# Patient Record
Sex: FEMALE | Race: BLACK OR AFRICAN AMERICAN | NOT HISPANIC OR LATINO
[De-identification: names, ages, dates, MRNs, and addresses within clinical notes are randomized per-mention and may not be internally consistent; named-entity substitution may affect disease eponyms.]

---

## 2021-11-08 ENCOUNTER — ASOB RESULT (OUTPATIENT)
Age: 26
End: 2021-11-08

## 2021-11-08 ENCOUNTER — APPOINTMENT (OUTPATIENT)
Dept: ANTEPARTUM | Facility: CLINIC | Age: 26
End: 2021-11-08
Payer: COMMERCIAL

## 2021-11-08 PROCEDURE — 76801 OB US < 14 WKS SINGLE FETUS: CPT

## 2021-11-08 PROCEDURE — 76813 OB US NUCHAL MEAS 1 GEST: CPT

## 2021-12-07 ENCOUNTER — APPOINTMENT (OUTPATIENT)
Dept: ANTEPARTUM | Facility: CLINIC | Age: 26
End: 2021-12-07

## 2021-12-27 ENCOUNTER — APPOINTMENT (OUTPATIENT)
Dept: ANTEPARTUM | Facility: CLINIC | Age: 26
End: 2021-12-27

## 2021-12-29 ENCOUNTER — LABORATORY RESULT (OUTPATIENT)
Age: 26
End: 2021-12-29

## 2021-12-30 ENCOUNTER — APPOINTMENT (OUTPATIENT)
Dept: OBGYN | Facility: CLINIC | Age: 26
End: 2021-12-30
Payer: COMMERCIAL

## 2021-12-30 VITALS — DIASTOLIC BLOOD PRESSURE: 69 MMHG | WEIGHT: 134 LBS | SYSTOLIC BLOOD PRESSURE: 105 MMHG

## 2021-12-30 DIAGNOSIS — Z82.49 FAMILY HISTORY OF ISCHEMIC HEART DISEASE AND OTHER DISEASES OF THE CIRCULATORY SYSTEM: ICD-10-CM

## 2021-12-30 PROBLEM — Z00.00 ENCOUNTER FOR PREVENTIVE HEALTH EXAMINATION: Status: ACTIVE | Noted: 2021-12-30

## 2021-12-30 PROCEDURE — 99204 OFFICE O/P NEW MOD 45 MIN: CPT

## 2022-01-28 ENCOUNTER — APPOINTMENT (OUTPATIENT)
Dept: OBGYN | Facility: CLINIC | Age: 27
End: 2022-01-28

## 2022-02-04 ENCOUNTER — APPOINTMENT (OUTPATIENT)
Dept: ANTEPARTUM | Facility: CLINIC | Age: 27
End: 2022-02-04
Payer: MEDICAID

## 2022-02-04 ENCOUNTER — ASOB RESULT (OUTPATIENT)
Age: 27
End: 2022-02-04

## 2022-02-04 PROCEDURE — 76805 OB US >/= 14 WKS SNGL FETUS: CPT

## 2022-02-06 ENCOUNTER — OUTPATIENT (OUTPATIENT)
Dept: INPATIENT UNIT | Facility: HOSPITAL | Age: 27
LOS: 1 days | Discharge: HOME | End: 2022-02-06
Payer: MEDICAID

## 2022-02-06 VITALS
TEMPERATURE: 98 F | RESPIRATION RATE: 16 BRPM | HEART RATE: 90 BPM | SYSTOLIC BLOOD PRESSURE: 112 MMHG | DIASTOLIC BLOOD PRESSURE: 65 MMHG

## 2022-02-06 VITALS — DIASTOLIC BLOOD PRESSURE: 59 MMHG | HEART RATE: 80 BPM | SYSTOLIC BLOOD PRESSURE: 113 MMHG

## 2022-02-06 DIAGNOSIS — Z3A.00 WEEKS OF GESTATION OF PREGNANCY NOT SPECIFIED: ICD-10-CM

## 2022-02-06 DIAGNOSIS — O26.899 OTHER SPECIFIED PREGNANCY RELATED CONDITIONS, UNSPECIFIED TRIMESTER: ICD-10-CM

## 2022-02-06 PROCEDURE — 99203 OFFICE O/P NEW LOW 30 MIN: CPT | Mod: 25

## 2022-02-06 PROCEDURE — 59025 FETAL NON-STRESS TEST: CPT | Mod: 26

## 2022-02-06 NOTE — OB RN TRIAGE NOTE - FALL HARM RISK - UNIVERSAL INTERVENTIONS
Bed in lowest position, wheels locked, appropriate side rails in place/Call bell, personal items and telephone in reach/Instruct patient to call for assistance before getting out of bed or chair/Non-slip footwear when patient is out of bed/[place] to call system/Physically safe environment - no spills, clutter or unnecessary equipment/Purposeful Proactive Rounding/Room/bathroom lighting operational, light cord in reach

## 2022-02-06 NOTE — OB PROVIDER TRIAGE NOTE - ADDITIONAL INSTRUCTIONS
cleared for discharge  advised to monitor FM and call if any decreased FM    follow up with recommended consult ( medicine and endocrine)    follow up with OB this week    s/s of PTL reviewed

## 2022-02-06 NOTE — OB PROVIDER TRIAGE NOTE - NSHPPHYSICALEXAM_GEN_ALL_CORE
pt seen and examined   placed on [facility]   ICU Vital Signs Last 24 Hrs  T(C): 36.7 (06 Feb 2022 15:52), Max: 36.7 (06 Feb 2022 15:52)  T(F): 98.1 (06 Feb 2022 15:52), Max: 98.1 (06 Feb 2022 15:52)  HR: 80 (06 Feb 2022 16:13) (80 - 90)  BP: 113/59 (06 Feb 2022 16:13) (112/65 - 113/59)  BP(mean): --  RR: 16 (06 Feb 2022 15:52) (16 - 16)  SpO2: 99%   pt alert OX3   lungs clear   heart s1 s2   abd soft nontender   FHR  135 pt seen and examined   placed on EFM   ICU Vital Signs Last 24 Hrs  T(C): 36.7 (06 Feb 2022 15:52), Max: 36.7 (06 Feb 2022 15:52)  T(F): 98.1 (06 Feb 2022 15:52), Max: 98.1 (06 Feb 2022 15:52)  HR: 80 (06 Feb 2022 16:13) (80 - 90)  BP: 113/59 (06 Feb 2022 16:13) (112/65 - 113/59)  BP(mean): --  RR: 16 (06 Feb 2022 15:52) (16 - 16)  SpO2: 99%   pt alert OX3   lungs clear   heart s1 s2   abd soft nontender   FHR  135  pt declined Vaginal exam  at this time   Abd scan vertex AAFI  BPP 8/8    pt sees and feels FM  during scan

## 2022-02-06 NOTE — OB PROVIDER TRIAGE NOTE - NSOBPROVIDERNOTE_OBGYN_ALL_OB_FT
26 year old female P0 at 25.5 weeks decreased FM  now feeling more fm in triage    BPP 8/8    normal leukorrhea of pregnancy  no evidence of rom on exam   no evidence of PTL       no respiratory issues on exam  normal O2 saturation  advised medical MD follow up as recommended by OB. return if any SOB CP Cough fevers     pt reassured questions answered.    case d/w  26 year old female P0 at 25.5 weeks decreased FM  now feeling more fm in triage    BPP 8/8  NST reactive    no evidence of PTL  no contractions on NST  declined SSE/VE  advised needs follow up with MD at office this week if still with vaginal discharge  denied any VB     no respiratory issues on exam  normal O2 saturation  advised medical MD follow up as recommended by OB. return if any SOB CP Cough fevers     pt reassured questions answered.    case d/w Dr Restrepo   cleared for discharge  advised to return if any further decreased FM   any VB  any LOF  any S/S of PTL   office follow up this week.

## 2022-02-06 NOTE — OB PROVIDER TRIAGE NOTE - HISTORY OF PRESENT ILLNESS
26 year old female P0 at 25.5 weeks who stated decreased FM  and stated that she had Anatomy scan done on Thursday and was told baby moving less on scan that  day  and wanted repeat scan because she felt decreased FM for last 3  days since the sonogram  pt stated that she feels FM while in Triage    stated that  she has  vaginal discharge with pregnancy  but not increased and denied any  LOF VB denied any fever chills dysuria denied any back pains   denied any contractions or cramping  denied any hx of cervical shortening   denied any hx of Covid  denied any cough  stated that she told OB  has been having intermittent SOB with pregnancy  denied any SOB in triage and stated was told by OB that she needs outpatient medicine follow up    and was awaiting  appointment for scan      med hx nasal polyp               small fibroids   surghx denied   OB hx P0   Gyn hx denied   NKDA      26 year old female P0 at 25.5 weeks who stated decreased FM  and stated that she had Anatomy scan done on Thursday and was told baby moving less on scan that  day  and wanted repeat scan because she felt decreased FM for last 3  days since the sonogram  pt stated that she feels FM while in Triage    stated that  she has  vaginal discharge with pregnancy  but not increased and denied any  LOF VB denied any fever chills dysuria denied any back pains   denied any contractions or cramping  denied any hx of cervical shortening   denied any hx of Covid  denied any cough  stated that she told OB  has been having intermittent SOB prior to with pregnancy  denied any SOB in triage denied any CP any palpitation dizziness  and stated was told by OB that she needs outpatient medicine follow up  also stated that  she needs follow up with endocrinologist for thyroid         med hx nasal polyp               small fibroids   surghx denied   OB hx P0   Gyn hx denied   NKDA

## 2022-02-06 NOTE — OB RN TRIAGE NOTE - CHIEF COMPLAINT QUOTE
decreased fm x 2-3 days ? increased discharge cramps and tightenting  increased sob  that I have a scan this month for decreased fm x 2-3 days ? increased discharge cramps and tightening  increased sob  that I have a scan this month for

## 2022-03-22 PROBLEM — D21.9 BENIGN NEOPLASM OF CONNECTIVE AND OTHER SOFT TISSUE, UNSPECIFIED: Chronic | Status: ACTIVE | Noted: 2022-02-06

## 2022-03-22 PROBLEM — Z87.09 PERSONAL HISTORY OF OTHER DISEASES OF THE RESPIRATORY SYSTEM: Chronic | Status: ACTIVE | Noted: 2022-02-06

## 2022-03-23 ENCOUNTER — APPOINTMENT (OUTPATIENT)
Dept: ANTEPARTUM | Facility: CLINIC | Age: 27
End: 2022-03-23
Payer: MEDICAID

## 2022-03-23 ENCOUNTER — ASOB RESULT (OUTPATIENT)
Age: 27
End: 2022-03-23

## 2022-03-23 PROCEDURE — 99202 OFFICE O/P NEW SF 15 MIN: CPT | Mod: TH,25

## 2022-03-23 PROCEDURE — 76818 FETAL BIOPHYS PROFILE W/NST: CPT

## 2022-03-23 PROCEDURE — 76816 OB US FOLLOW-UP PER FETUS: CPT

## 2022-03-30 ENCOUNTER — ASOB RESULT (OUTPATIENT)
Age: 27
End: 2022-03-30

## 2022-03-30 ENCOUNTER — APPOINTMENT (OUTPATIENT)
Dept: ANTEPARTUM | Facility: CLINIC | Age: 27
End: 2022-03-30

## 2022-03-30 ENCOUNTER — APPOINTMENT (OUTPATIENT)
Dept: ANTEPARTUM | Facility: CLINIC | Age: 27
End: 2022-03-30
Payer: MEDICAID

## 2022-03-30 PROCEDURE — 76818 FETAL BIOPHYS PROFILE W/NST: CPT

## 2022-04-06 ENCOUNTER — APPOINTMENT (OUTPATIENT)
Dept: ANTEPARTUM | Facility: CLINIC | Age: 27
End: 2022-04-06
Payer: MEDICAID

## 2022-04-06 ENCOUNTER — ASOB RESULT (OUTPATIENT)
Age: 27
End: 2022-04-06

## 2022-04-06 PROCEDURE — 76819 FETAL BIOPHYS PROFIL W/O NST: CPT

## 2022-04-13 ENCOUNTER — APPOINTMENT (OUTPATIENT)
Dept: ANTEPARTUM | Facility: CLINIC | Age: 27
End: 2022-04-13
Payer: MEDICAID

## 2022-04-13 ENCOUNTER — APPOINTMENT (OUTPATIENT)
Dept: ANTEPARTUM | Facility: CLINIC | Age: 27
End: 2022-04-13

## 2022-04-13 ENCOUNTER — ASOB RESULT (OUTPATIENT)
Age: 27
End: 2022-04-13

## 2022-04-13 PROCEDURE — 76816 OB US FOLLOW-UP PER FETUS: CPT

## 2022-04-13 PROCEDURE — 76818 FETAL BIOPHYS PROFILE W/NST: CPT

## 2022-04-20 ENCOUNTER — APPOINTMENT (OUTPATIENT)
Dept: ANTEPARTUM | Facility: CLINIC | Age: 27
End: 2022-04-20
Payer: MEDICAID

## 2022-04-20 ENCOUNTER — ASOB RESULT (OUTPATIENT)
Age: 27
End: 2022-04-20

## 2022-04-20 PROCEDURE — 76818 FETAL BIOPHYS PROFILE W/NST: CPT

## 2022-04-26 ENCOUNTER — APPOINTMENT (OUTPATIENT)
Dept: ANTEPARTUM | Facility: CLINIC | Age: 27
End: 2022-04-26
Payer: MEDICAID

## 2022-04-26 ENCOUNTER — ASOB RESULT (OUTPATIENT)
Age: 27
End: 2022-04-26

## 2022-04-26 PROCEDURE — 76818 FETAL BIOPHYS PROFILE W/NST: CPT

## 2022-05-03 ENCOUNTER — TRANSCRIPTION ENCOUNTER (OUTPATIENT)
Age: 27
End: 2022-05-03

## 2022-05-03 ENCOUNTER — INPATIENT (INPATIENT)
Facility: HOSPITAL | Age: 27
LOS: 2 days | Discharge: HOME | End: 2022-05-06
Attending: STUDENT IN AN ORGANIZED HEALTH CARE EDUCATION/TRAINING PROGRAM | Admitting: STUDENT IN AN ORGANIZED HEALTH CARE EDUCATION/TRAINING PROGRAM

## 2022-05-03 VITALS
HEART RATE: 77 BPM | HEIGHT: 66 IN | DIASTOLIC BLOOD PRESSURE: 58 MMHG | OXYGEN SATURATION: 99 % | TEMPERATURE: 98 F | SYSTOLIC BLOOD PRESSURE: 115 MMHG | RESPIRATION RATE: 14 BRPM | WEIGHT: 162.92 LBS

## 2022-05-03 DIAGNOSIS — O43.129 VELAMENTOUS INSERTION OF UMBILICAL CORD, UNSPECIFIED TRIMESTER: ICD-10-CM

## 2022-05-03 LAB
BASOPHILS # BLD AUTO: 0.01 K/UL — SIGNIFICANT CHANGE UP (ref 0–0.2)
BASOPHILS NFR BLD AUTO: 0.1 % — SIGNIFICANT CHANGE UP (ref 0–2)
BLD GP AB SCN SERPL QL: NEGATIVE — SIGNIFICANT CHANGE UP
EOSINOPHIL # BLD AUTO: 0.05 K/UL — SIGNIFICANT CHANGE UP (ref 0–0.5)
EOSINOPHIL NFR BLD AUTO: 0.6 % — SIGNIFICANT CHANGE UP (ref 0–6)
HCT VFR BLD CALC: 33.2 % — LOW (ref 34.5–45)
HGB BLD-MCNC: 10.8 G/DL — LOW (ref 11.5–15.5)
IANC: 5.27 K/UL — SIGNIFICANT CHANGE UP (ref 1.8–7.4)
IMM GRANULOCYTES NFR BLD AUTO: 0.3 % — SIGNIFICANT CHANGE UP (ref 0–1.5)
LYMPHOCYTES # BLD AUTO: 1.52 K/UL — SIGNIFICANT CHANGE UP (ref 1–3.3)
LYMPHOCYTES # BLD AUTO: 19.3 % — SIGNIFICANT CHANGE UP (ref 13–44)
MCHC RBC-ENTMCNC: 26.2 PG — LOW (ref 27–34)
MCHC RBC-ENTMCNC: 32.5 GM/DL — SIGNIFICANT CHANGE UP (ref 32–36)
MCV RBC AUTO: 80.6 FL — SIGNIFICANT CHANGE UP (ref 80–100)
MONOCYTES # BLD AUTO: 1 K/UL — HIGH (ref 0–0.9)
MONOCYTES NFR BLD AUTO: 12.7 % — SIGNIFICANT CHANGE UP (ref 2–14)
NEUTROPHILS # BLD AUTO: 5.27 K/UL — SIGNIFICANT CHANGE UP (ref 1.8–7.4)
NEUTROPHILS NFR BLD AUTO: 67 % — SIGNIFICANT CHANGE UP (ref 43–77)
NRBC # BLD: 0 /100 WBCS — SIGNIFICANT CHANGE UP
NRBC # FLD: 0 K/UL — SIGNIFICANT CHANGE UP
PLATELET # BLD AUTO: 260 K/UL — SIGNIFICANT CHANGE UP (ref 150–400)
RBC # BLD: 4.12 M/UL — SIGNIFICANT CHANGE UP (ref 3.8–5.2)
RBC # FLD: 17 % — HIGH (ref 10.3–14.5)
RH IG SCN BLD-IMP: POSITIVE — SIGNIFICANT CHANGE UP
RH IG SCN BLD-IMP: POSITIVE — SIGNIFICANT CHANGE UP
WBC # BLD: 7.87 K/UL — SIGNIFICANT CHANGE UP (ref 3.8–10.5)
WBC # FLD AUTO: 7.87 K/UL — SIGNIFICANT CHANGE UP (ref 3.8–10.5)

## 2022-05-03 RX ORDER — SODIUM CHLORIDE 9 MG/ML
1000 INJECTION, SOLUTION INTRAVENOUS
Refills: 0 | Status: DISCONTINUED | OUTPATIENT
Start: 2022-05-03 | End: 2022-05-03

## 2022-05-03 RX ORDER — CITRIC ACID/SODIUM CITRATE 300-500 MG
15 SOLUTION, ORAL ORAL EVERY 6 HOURS
Refills: 0 | Status: DISCONTINUED | OUTPATIENT
Start: 2022-05-03 | End: 2022-05-04

## 2022-05-03 RX ORDER — OXYTOCIN 10 UNIT/ML
333.33 VIAL (ML) INJECTION
Qty: 20 | Refills: 0 | Status: COMPLETED | OUTPATIENT
Start: 2022-05-03 | End: 2022-05-03

## 2022-05-03 RX ORDER — SODIUM CHLORIDE 9 MG/ML
1000 INJECTION, SOLUTION INTRAVENOUS
Refills: 0 | Status: DISCONTINUED | OUTPATIENT
Start: 2022-05-03 | End: 2022-05-04

## 2022-05-03 RX ORDER — OXYTOCIN 10 UNIT/ML
333.33 VIAL (ML) INJECTION
Qty: 20 | Refills: 0 | Status: DISCONTINUED | OUTPATIENT
Start: 2022-05-03 | End: 2022-05-03

## 2022-05-03 RX ORDER — CITRIC ACID/SODIUM CITRATE 300-500 MG
15 SOLUTION, ORAL ORAL EVERY 6 HOURS
Refills: 0 | Status: DISCONTINUED | OUTPATIENT
Start: 2022-05-03 | End: 2022-05-03

## 2022-05-03 NOTE — OB RN PATIENT PROFILE - FALL HARM RISK - UNIVERSAL INTERVENTIONS
Bed in lowest position, wheels locked, appropriate side rails in place/Call bell, personal items and telephone in reach/Instruct patient to call for assistance before getting out of bed or chair/Non-slip footwear when patient is out of bed/[place] to call system/Physically safe environment - no spills, clutter or unnecessary equipment/Purposeful Proactive Rounding/Room/bathroom lighting operational, light cord in reach

## 2022-05-03 NOTE — OB PROVIDER H&P - ASSESSMENT
Assessment  – 27yo  @38wk presents for scheduled IOL for umbilical vein varix. Patient stable and doing well.    Plan  1. Admit to LND. Routine Labs. IVF.  2. IOL w/ PO Cytotec. CB PRN.  3. Fetus: cat 1 tracing. VTX. EFW 3200g by sono. Continuous EFM. Sono. No concerns.  4. Prenatal issues: umbilical vein varix. h/o BV. Fibroids - 2uPRBC on hold.   5. GBS unknown  6. Pain: IV pain meds/epidural PRN    Patient discussed with attending physician, Dr. Cisneros.  Milly Cuellar MD PGY2 Assessment  – 25yo  @38wk presents for scheduled IOL for umbilical vein varix. Patient stable and doing well.    Plan  1. Admit to LND. Routine Labs. IVF.  2. IOL w/ PO Cytotec. CB PRN.  3. Fetus: cat 1 tracing. VTX. EFW 3200g by sono. Continuous EFM. Sono. No concerns.  4. Prenatal issues: umbilical vein varix. h/o BV. Fibroids - 2uPRBC on hold.   5. GBS neg  6. Pain: IV pain meds/epidural PRN    Patient discussed with attending physician, Dr. Cisneros.  Milly Cuellar MD PGY2

## 2022-05-03 NOTE — OB PROVIDER H&P - NSHPPHYSICALEXAM_GEN_ALL_CORE
Objective  – Vital Signs  Vital Signs Last 24 Hrs  T(C): 36.8 (03 May 2022 18:10), Max: 36.8 (03 May 2022 18:10)  T(F): 98.2 (03 May 2022 18:10), Max: 98.2 (03 May 2022 18:10)  HR: 77 (03 May 2022 18:10) (77 - 77)  BP: 115/58 (03 May 2022 18:10) (115/58 - 115/58)  RR: 14 (03 May 2022 18:10) (14 - 14)  SpO2: 99% (03 May 2022 18:10) (99% - 99%)  – PE:   CV: RRR  Pulm: breathing comfortably on RA  Abd: gravid, nontender  Extr: moving all extremities with ease  – VE: 0/0/-3  – FHT: baseline 130, mod variability, +accels, -decels  – [place]: irritability  – EFW: 3200g by sono  – Sono: vertex

## 2022-05-03 NOTE — OB PROVIDER H&P - HISTORY OF PRESENT ILLNESS
Admission H&P    Subjective  HPI: 25yo  @38wk presents for scheduled IOL for umbilical vein varix. +FM. -LOF. -CTXs. -VB. Pt denies any other concerns.    – PNC:    GBS unknown.  EFW 3200g by edvin.  – OBHx: denies  – GynHx: fibroids (unknown location and size). Bacterial vaginosis s/p treatment x2 in this pregnancy. Denies h/o other STIs, abnormal paps or ovarian cysts.  – PMH: anemia - no h/o IV Fe  – PSH: denies  – Psych: denies  – Social: denies tobacco use, denies EtOH use in this pregnancy. Endorses smoking marijuana twice weekly, but never in pregnancy.   – Meds: PNV, Fe  – Allergies: Cornstarch (itch, hives), NKDA  – Will accept blood transfusions? Yes Admission H&P    Subjective  HPI: 25yo  @38wk presents for scheduled IOL for umbilical vein varix. +FM. -LOF. -CTXs. -VB. Pt denies any other concerns.    – PNC: GBS neg.  EFW 3200g by edvin.  – OBHx: denies  – GynHx: fibroids (unknown location and size). Bacterial vaginosis s/p treatment x2 in this pregnancy. Denies h/o other STIs, abnormal paps or ovarian cysts.  – PMH: anemia - no h/o IV Fe  – PSH: denies  – Psych: denies  – Social: denies tobacco use, denies EtOH use in this pregnancy. Endorses smoking marijuana twice weekly, but never in pregnancy.   – Meds: PNV, Fe  – Allergies: Cornstarch (itch, hives), NKDA  – Will accept blood transfusions? Yes

## 2022-05-04 LAB
COVID-19 SPIKE DOMAIN AB INTERP: POSITIVE
COVID-19 SPIKE DOMAIN ANTIBODY RESULT: 32.2 U/ML — HIGH
SARS-COV-2 IGG+IGM SERPL QL IA: 32.2 U/ML — HIGH
SARS-COV-2 IGG+IGM SERPL QL IA: POSITIVE
T PALLIDUM AB TITR SER: NEGATIVE — SIGNIFICANT CHANGE UP

## 2022-05-04 DEVICE — SURGICEL POWDER 3 GRAMS: Type: IMPLANTABLE DEVICE | Status: FUNCTIONAL

## 2022-05-04 RX ORDER — MAGNESIUM HYDROXIDE 400 MG/1
30 TABLET, CHEWABLE ORAL
Refills: 0 | Status: DISCONTINUED | OUTPATIENT
Start: 2022-05-04 | End: 2022-05-06

## 2022-05-04 RX ORDER — ACETAMINOPHEN 500 MG
975 TABLET ORAL
Refills: 0 | Status: DISCONTINUED | OUTPATIENT
Start: 2022-05-04 | End: 2022-05-06

## 2022-05-04 RX ORDER — HEPARIN SODIUM 5000 [USP'U]/ML
5000 INJECTION INTRAVENOUS; SUBCUTANEOUS EVERY 12 HOURS
Refills: 0 | Status: DISCONTINUED | OUTPATIENT
Start: 2022-05-04 | End: 2022-05-06

## 2022-05-04 RX ORDER — CITRIC ACID/SODIUM CITRATE 300-500 MG
30 SOLUTION, ORAL ORAL ONCE
Refills: 0 | Status: COMPLETED | OUTPATIENT
Start: 2022-05-04 | End: 2022-05-04

## 2022-05-04 RX ORDER — IBUPROFEN 200 MG
600 TABLET ORAL EVERY 6 HOURS
Refills: 0 | Status: COMPLETED | OUTPATIENT
Start: 2022-05-04 | End: 2023-04-02

## 2022-05-04 RX ORDER — TETANUS TOXOID, REDUCED DIPHTHERIA TOXOID AND ACELLULAR PERTUSSIS VACCINE, ADSORBED 5; 2.5; 8; 8; 2.5 [IU]/.5ML; [IU]/.5ML; UG/.5ML; UG/.5ML; UG/.5ML
0.5 SUSPENSION INTRAMUSCULAR ONCE
Refills: 0 | Status: DISCONTINUED | OUTPATIENT
Start: 2022-05-04 | End: 2022-05-06

## 2022-05-04 RX ORDER — OXYTOCIN 10 UNIT/ML
333.33 VIAL (ML) INJECTION
Qty: 20 | Refills: 0 | Status: DISCONTINUED | OUTPATIENT
Start: 2022-05-04 | End: 2022-05-05

## 2022-05-04 RX ORDER — OXYCODONE HYDROCHLORIDE 5 MG/1
5 TABLET ORAL ONCE
Refills: 0 | Status: DISCONTINUED | OUTPATIENT
Start: 2022-05-04 | End: 2022-05-06

## 2022-05-04 RX ORDER — SIMETHICONE 80 MG/1
80 TABLET, CHEWABLE ORAL EVERY 4 HOURS
Refills: 0 | Status: DISCONTINUED | OUTPATIENT
Start: 2022-05-04 | End: 2022-05-06

## 2022-05-04 RX ORDER — METOCLOPRAMIDE HCL 10 MG
10 TABLET ORAL ONCE
Refills: 0 | Status: COMPLETED | OUTPATIENT
Start: 2022-05-04 | End: 2022-05-04

## 2022-05-04 RX ORDER — OXYCODONE HYDROCHLORIDE 5 MG/1
5 TABLET ORAL
Refills: 0 | Status: DISCONTINUED | OUTPATIENT
Start: 2022-05-04 | End: 2022-05-06

## 2022-05-04 RX ORDER — KETOROLAC TROMETHAMINE 30 MG/ML
30 SYRINGE (ML) INJECTION EVERY 6 HOURS
Refills: 0 | Status: COMPLETED | OUTPATIENT
Start: 2022-05-04 | End: 2022-05-05

## 2022-05-04 RX ORDER — SODIUM CHLORIDE 9 MG/ML
1000 INJECTION, SOLUTION INTRAVENOUS
Refills: 0 | Status: DISCONTINUED | OUTPATIENT
Start: 2022-05-04 | End: 2022-05-06

## 2022-05-04 RX ORDER — FAMOTIDINE 10 MG/ML
20 INJECTION INTRAVENOUS ONCE
Refills: 0 | Status: COMPLETED | OUTPATIENT
Start: 2022-05-04 | End: 2022-05-04

## 2022-05-04 RX ORDER — LANOLIN
1 OINTMENT (GRAM) TOPICAL EVERY 6 HOURS
Refills: 0 | Status: DISCONTINUED | OUTPATIENT
Start: 2022-05-04 | End: 2022-05-06

## 2022-05-04 RX ORDER — SODIUM CHLORIDE 9 MG/ML
1000 INJECTION, SOLUTION INTRAVENOUS
Refills: 0 | Status: DISCONTINUED | OUTPATIENT
Start: 2022-05-04 | End: 2022-05-05

## 2022-05-04 RX ORDER — ONDANSETRON 8 MG/1
4 TABLET, FILM COATED ORAL ONCE
Refills: 0 | Status: COMPLETED | OUTPATIENT
Start: 2022-05-04 | End: 2022-05-04

## 2022-05-04 RX ORDER — DIPHENHYDRAMINE HCL 50 MG
25 CAPSULE ORAL EVERY 6 HOURS
Refills: 0 | Status: DISCONTINUED | OUTPATIENT
Start: 2022-05-04 | End: 2022-05-06

## 2022-05-04 RX ADMIN — Medication 10 MILLIGRAM(S): at 15:10

## 2022-05-04 RX ADMIN — ONDANSETRON 4 MILLIGRAM(S): 8 TABLET, FILM COATED ORAL at 17:52

## 2022-05-04 RX ADMIN — Medication 1000 MILLIUNIT(S)/MIN: at 14:44

## 2022-05-04 RX ADMIN — Medication 30 MILLILITER(S): at 12:41

## 2022-05-04 RX ADMIN — HEPARIN SODIUM 5000 UNIT(S): 5000 INJECTION INTRAVENOUS; SUBCUTANEOUS at 22:50

## 2022-05-04 RX ADMIN — FAMOTIDINE 20 MILLIGRAM(S): 10 INJECTION INTRAVENOUS at 12:41

## 2022-05-04 RX ADMIN — Medication 975 MILLIGRAM(S): at 23:44

## 2022-05-04 RX ADMIN — SODIUM CHLORIDE 75 MILLILITER(S): 9 INJECTION, SOLUTION INTRAVENOUS at 14:44

## 2022-05-04 RX ADMIN — Medication 975 MILLIGRAM(S): at 22:44

## 2022-05-04 RX ADMIN — Medication 0.25 MILLIGRAM(S): at 12:30

## 2022-05-04 NOTE — CHART NOTE - NSCHARTNOTEFT_GEN_A_CORE
Pt was examined s/p epidural for recurrent late decelerations   s/p Terb x1   given 1v fluid bolus   Patient was counseled for C/s for Cat 2 tracing remote for delivery   Patient was agreeable. Patient signed Informed consent   L/D team aware
NP note    FHR baseline indeterminate  Wandering baseline noted  Moderate variability  Patient currently on birthing ball  Patient requested to lie in bed to determine contraction pattern more accurately  IV bolus initiated by RN  Will closely monitor    MD Bender made aware    Nellie RUEDA
patient seen at bedside  ambulating next to bed  feeling well, no complaints  FHT Cat I, [place] irregular  on PO cytotec, 40 mcg  patient declined vaginal exam  continue cytotec induction

## 2022-05-04 NOTE — OB RN INTRAOPERATIVE NOTE - NSSELHIDDEN_OBGYN_ALL_OB_FT
[NS_DeliveryAttending1_OBGYN_ALL_OB_FT:[email or web address]=],[NS_DeliveryRN_OBGYN_ALL_OB_FT:[id number]=] [NS_DeliveryAttending1_OBGYN_ALL_OB_FT:[email or web address]=],[NS_DeliveryRN_OBGYN_ALL_OB_FT:[id number]=],[NS_DeliveryAssist1_OBGYN_ALL_OB_FT:[id number]=]

## 2022-05-04 NOTE — OB NEONATOLOGY/PEDIATRICIAN DELIVERY SUMMARY - NSPEDSNEONOTESA_OBGYN_ALL_OB_FT
Pediatrician called to delivery for primary  for category II tracing. Male infant born at 38.1 wks via  to a 27 y/o  blood type B+ mother, received versed during labor. Maternal history of anemia. Prenatal history of bacterial vaginosis treated twice during pregnancy, and umbilical vein varix. Prenatal labs RPR non-reactive, HBsAg -, Rubella immune, HIV -, GBS - on . COVID neg. ROM at delivery with clear fluids. Baby emerged vigorous, crying. Cord clamping not delayed. Infant was brought to radiant warmer and warmed, dried, stimulated and suctioned. HR>100, normal respiratory effort, good tone. APGARS of 8/9. Mom is initiating breast feeding. Consents to Hepatitis B vaccination. Desires for infant to be circumcised. EOS score not applicable as no labor, no rupture.

## 2022-05-04 NOTE — OB RN DELIVERY SUMMARY - NS_SEPSISRSKCALC_OBGYN_ALL_OB_FT
EOS calculated successfully. EOS Risk Factor: 0.5/1000 live births ([facility] national incidence); GA=38w1d; Temp=99.32; ROM=0.017; GBS='Negative'; Antibiotics='No antibiotics or any antibiotics < 2 hrs prior to birth'

## 2022-05-04 NOTE — OB PROVIDER DELIVERY SUMMARY - NSPROVIDERDELIVERYNOTE_OBGYN_ALL_OB_FT
viable infant, cephalic presentation, weight 2870g, APGARS 8/9  grossly normal uterus, b/l tubes and ovaries  hysterotomy closed in 1 layer with chromic suture  TXA and Methergine administered for uterine atony  Surgicel applied to hysterotomy  rectus muscle reapproximated with chromic suture    436/1300/250    Dictation #: viable infant, cephalic presentation, weight 2870g, APGARS 8/9  grossly normal uterus, b/l tubes and ovaries  hysterotomy closed in 1 layer with chromic suture  TXA and Methergine administered for uterine atony  Surgicel applied to hysterotomy  rectus muscle reapproximated with chromic suture    436/1300/250    Dictation #: [id number]

## 2022-05-04 NOTE — OB RN DELIVERY SUMMARY - NSSELHIDDEN_OBGYN_ALL_OB_FT
[NS_DeliveryAttending1_OBGYN_ALL_OB_FT:[email or web address]=],[NS_DeliveryRN_OBGYN_ALL_OB_FT:[id number]=] [NS_DeliveryAttending1_OBGYN_ALL_OB_FT:MjYzNTgzMDExOTA=],[NS_DeliveryRN_OBGYN_ALL_OB_FT:[id number]=],[NS_DeliveryAssist1_OBGYN_ALL_OB_FT:[id number]=]

## 2022-05-04 NOTE — OB PROVIDER DELIVERY SUMMARY - NSSELHIDDEN_OBGYN_ALL_OB_FT
[NS_DeliveryAttending1_OBGYN_ALL_OB_FT:[email or web address]=],[NS_DeliveryRN_OBGYN_ALL_OB_FT:[id number]=]

## 2022-05-04 NOTE — OB PROVIDER LABOR PROGRESS NOTE - NS_SUBJECTIVE/OBJECTIVE_OBGYN_ALL_OB_FT
Patient seen and examined secondary to category 2 tracing.  VS  T(C): 36.7 (05-04-22 @ 07:35)  HR: 76 (05-04-22 @ 07:34)  BP: 116/55 (05-04-22 @ 07:34)  RR: 16 (05-03-22 @ 19:29)  SpO2: 99% (05-04-22 @ 07:33)
Patient seen and evaluated at bedside for cervical exam and attempted cervical balloon placement. Patient comfortable.     Vital Signs Last 24 Hrs  T(C): 36.7 (04 May 2022 07:35), Max: 36.8 (03 May 2022 18:10)  T(F): 98.06 (04 May 2022 07:35), Max: 98.2 (03 May 2022 18:10)  HR: 76 (04 May 2022 07:34) (76 - 81)  BP: 116/55 (04 May 2022 07:34) (112/56 - 116/55)  BP(mean): --  RR: 16 (03 May 2022 19:29) (14 - 16)  SpO2: 99% (04 May 2022 07:33) (99% - 99%)
Patient with recurrent late decelerations unresponsive to fetal resuscitations.   Terbutaline x1 given due to recurrent decelerations with ctx.   Discussed with Dr. Lopez who is in the OR.   Plan for  delivery.   Will start moving patient to OR

## 2022-05-04 NOTE — OB PROVIDER LABOR PROGRESS NOTE - ASSESSMENT
Admitted for IOL for umbilical vein varix.   Persistent cat 2 tracing  Plan for  delivery   Will start moving to OR     TAMMIE Carroll PGY4  d/w Dr. Lopez     
27yo  @38+1 admitted for IOL for umbilcal vein varix. Patient stable and doing well. Patient evaluated for cervical balloon placement, however, patient not dilated, so balloon not placed.     Plan  - Continue PO Cytotec  - CB when able  - T cat 1  - GBS neg    d/w Dr. Yulia Cuellar PGY2
No cervical change noted  Patient repositioned left lateral  Oxygen via nonrebreather placed  MD Bender notified of category II tracing  PO cytotec held at this time  MD to come evaluate tracing at this time  Will continue to closely monitor    Nellie RUEDA

## 2022-05-04 NOTE — OB PROVIDER DELIVERY SUMMARY - DELIVERY COMPLICATIONS; ABNORMAL FETAL HEART RATE
Regarding: [place]- constipation   ----- Message from Timothy Harmon sent at 9/9/2021  8:04 AM CDT -----  Patient Name: Navin Bourgeois    Full Name of Provider seen for current symptoms:Primo Mariano, DO    Pregnant (If Yes, how long?):    Symptoms: constipation - wants to know what meds they gave her to help with constipation     Do you or any of your household members have the following symptoms:  Fever >100.0#F or >38.0#C: No    New or worsening cough, shortness of breath, sore throat, congestion, or runny nose: No    New onset of nausea, vomiting or diarrhea: No    New onset of loss of taste or smell, chills, repeated shaking with chills, muscle pain, or headache: No    Have you, a household member, or another person you have been in contact with tested positive for COVID-19 in the last 14 days?: No    Call Back #:[phone]    Call Center Account # for provider seen for current symptoms:[id number]    Which State are you currently located in? (enter State name in Summary field): [place]    variable decelerations

## 2022-05-05 LAB
BASOPHILS # BLD AUTO: 0.02 K/UL — SIGNIFICANT CHANGE UP (ref 0–0.2)
BASOPHILS NFR BLD AUTO: 0.2 % — SIGNIFICANT CHANGE UP (ref 0–2)
EOSINOPHIL # BLD AUTO: 0.02 K/UL — SIGNIFICANT CHANGE UP (ref 0–0.5)
EOSINOPHIL NFR BLD AUTO: 0.2 % — SIGNIFICANT CHANGE UP (ref 0–6)
HCT VFR BLD CALC: 32.2 % — LOW (ref 34.5–45)
HGB BLD-MCNC: 10.2 G/DL — LOW (ref 11.5–15.5)
IANC: 9.92 K/UL — HIGH (ref 1.8–7.4)
IMM GRANULOCYTES NFR BLD AUTO: 0.4 % — SIGNIFICANT CHANGE UP (ref 0–1.5)
LYMPHOCYTES # BLD AUTO: 1.47 K/UL — SIGNIFICANT CHANGE UP (ref 1–3.3)
LYMPHOCYTES # BLD AUTO: 11.5 % — LOW (ref 13–44)
MCHC RBC-ENTMCNC: 25.4 PG — LOW (ref 27–34)
MCHC RBC-ENTMCNC: 31.7 GM/DL — LOW (ref 32–36)
MCV RBC AUTO: 80.3 FL — SIGNIFICANT CHANGE UP (ref 80–100)
MONOCYTES # BLD AUTO: 1.3 K/UL — HIGH (ref 0–0.9)
MONOCYTES NFR BLD AUTO: 10.2 % — SIGNIFICANT CHANGE UP (ref 2–14)
NEUTROPHILS # BLD AUTO: 9.92 K/UL — HIGH (ref 1.8–7.4)
NEUTROPHILS NFR BLD AUTO: 77.5 % — HIGH (ref 43–77)
NRBC # BLD: 0 /100 WBCS — SIGNIFICANT CHANGE UP
NRBC # FLD: 0 K/UL — SIGNIFICANT CHANGE UP
PLATELET # BLD AUTO: 225 K/UL — SIGNIFICANT CHANGE UP (ref 150–400)
RBC # BLD: 4.01 M/UL — SIGNIFICANT CHANGE UP (ref 3.8–5.2)
RBC # FLD: 16.8 % — HIGH (ref 10.3–14.5)
WBC # BLD: 12.78 K/UL — HIGH (ref 3.8–10.5)
WBC # FLD AUTO: 12.78 K/UL — HIGH (ref 3.8–10.5)

## 2022-05-05 RX ORDER — IBUPROFEN 200 MG
600 TABLET ORAL EVERY 6 HOURS
Refills: 0 | Status: DISCONTINUED | OUTPATIENT
Start: 2022-05-05 | End: 2022-05-06

## 2022-05-05 RX ADMIN — SIMETHICONE 80 MILLIGRAM(S): 80 TABLET, CHEWABLE ORAL at 22:34

## 2022-05-05 RX ADMIN — Medication 975 MILLIGRAM(S): at 22:37

## 2022-05-05 RX ADMIN — Medication 975 MILLIGRAM(S): at 13:50

## 2022-05-05 RX ADMIN — HEPARIN SODIUM 5000 UNIT(S): 5000 INJECTION INTRAVENOUS; SUBCUTANEOUS at 22:34

## 2022-05-05 RX ADMIN — Medication 30 MILLIGRAM(S): at 03:38

## 2022-05-05 RX ADMIN — Medication 975 MILLIGRAM(S): at 14:30

## 2022-05-05 RX ADMIN — Medication 30 MILLIGRAM(S): at 11:07

## 2022-05-05 RX ADMIN — Medication 600 MILLIGRAM(S): at 17:17

## 2022-05-05 RX ADMIN — HEPARIN SODIUM 5000 UNIT(S): 5000 INJECTION INTRAVENOUS; SUBCUTANEOUS at 10:46

## 2022-05-05 RX ADMIN — Medication 30 MILLIGRAM(S): at 10:46

## 2022-05-05 RX ADMIN — Medication 975 MILLIGRAM(S): at 23:37

## 2022-05-05 RX ADMIN — Medication 30 MILLIGRAM(S): at 04:38

## 2022-05-05 RX ADMIN — Medication 600 MILLIGRAM(S): at 18:00

## 2022-05-05 NOTE — PROGRESS NOTE ADULT - SUBJECTIVE AND OBJECTIVE BOX
Post-Operative Note, C/S  She is a  26y woman who is now post-operative day: 1    Subjective:  The patient feels well.  She is ambulating.   She is tolerating regular diet.  She denies nausea and vomiting; denies fever.  She is voiding.  Her pain is controlled; incisional pain is appropriate.  She reports normal postpartum bleeding.  She is breastfeeding.  She is formula feeding.    Physical exam:    Vital Signs Last 24 Hrs  T(C): 36.8 (05 May 2022 09:48), Max: 36.9 (04 May 2022 22:36)  T(F): 98.3 (05 May 2022 09:48), Max: 98.5 (05 May 2022 01:42)  HR: 83 (05 May 2022 09:48) (67 - 87)  BP: 102/65 (05 May 2022 09:48) (102/65 - 134/81)  BP(mean): 92 (04 May 2022 16:00) (74 - 92)  RR: 18 (05 May 2022 05:31) (12 - 19)  SpO2: 100% (05 May 2022 09:48) (97% - 100%)    Gen: NAD  Breast: Soft, nontender, not engorged.  Abdomen: Soft, nontender, no distension , firm uterine fundus at umbilicus.  Incision: C/D/I.  Pelvic: Normal lochia noted  Ext: No calf tenderness    LABS:                        10.2   12.78 )-----------( 225      ( 05 May 2022 05:38 )             32.2       Rubella status:     Allergies    No Known Allergies    Intolerances      MEDICATIONS  (STANDING):  acetaminophen     Tablet .. 975 milliGRAM(s) Oral <User Schedule>  diphtheria/tetanus/pertussis (acellular) Vaccine (ADAcel) 0.5 milliLiter(s) IntraMuscular once  heparin   Injectable 5000 Unit(s) SubCutaneous every 12 hours  ibuprofen  Tablet. 600 milliGRAM(s) Oral every 6 hours  ketorolac   Injectable 30 milliGRAM(s) IV Push every 6 hours  lactated ringers. 1000 milliLiter(s) (125 mL/Hr) IV Continuous <Continuous>  lactated ringers. 1000 milliLiter(s) (75 mL/Hr) IV Continuous <Continuous>  oxytocin Infusion 333.333 milliUNIT(s)/Min (1000 mL/Hr) IV Continuous <Continuous>    MEDICATIONS  (PRN):  diphenhydrAMINE 25 milliGRAM(s) Oral every 6 hours PRN Pruritus  lanolin Ointment 1 Application(s) Topical every 6 hours PRN Sore Nipples  magnesium hydroxide Suspension 30 milliLiter(s) Oral two times a day PRN Constipation  oxyCODONE    IR 5 milliGRAM(s) Oral every 3 hours PRN Moderate to Severe Pain (4-10)  oxyCODONE    IR 5 milliGRAM(s) Oral once PRN Moderate to Severe Pain (4-10)  simethicone 80 milliGRAM(s) Chew every 4 hours PRN Gas        Assessment and Plan  POD # s/p C/S.  Doing well.  Encourage ambulation.  Incisional care and PO instructions reviewed.  CPC.         Post-Operative Note, C/S  She is a  26y woman who is now post-operative day: 1    Subjective:  The patient feels well.  She is ambulating.   She is tolerating regular diet.  She denies nausea and vomiting; denies fever.  She is voiding.  Her pain is controlled; incisional pain is appropriate.  She reports normal postpartum bleeding.  She is breastfeeding.  She is formula feeding.    Physical exam:    Vital Signs Last 24 Hrs  T(C): 36.8 (05 May 2022 09:48), Max: 36.9 (04 May 2022 22:36)  T(F): 98.3 (05 May 2022 09:48), Max: 98.5 (05 May 2022 01:42)  HR: 83 (05 May 2022 09:48) (67 - 87)  BP: 102/65 (05 May 2022 09:48) (102/65 - 134/81)  BP(mean): 92 (04 May 2022 16:00) (74 - 92)  RR: 18 (05 May 2022 05:31) (12 - 19)  SpO2: 100% (05 May 2022 09:48) (97% - 100%)    Gen: NAD  Breast: Soft, nontender, not engorged.  Abdomen: Soft, nontender, no distension , firm uterine fundus at umbilicus.  Incision: C/D/I.  Pelvic: Normal lochia noted  Ext: No calf tenderness    LABS:                        10.2   12.78 )-----------( 225      ( 05 May 2022 05:38 )             32.2       Rubella status:     Allergies    No Known Allergies    Intolerances      MEDICATIONS  (STANDING):  acetaminophen     Tablet .. 975 milliGRAM(s) Oral <User Schedule>  diphtheria/tetanus/pertussis (acellular) Vaccine (ADAcel) 0.5 milliLiter(s) IntraMuscular once  heparin   Injectable 5000 Unit(s) SubCutaneous every 12 hours  ibuprofen  Tablet. 600 milliGRAM(s) Oral every 6 hours  ketorolac   Injectable 30 milliGRAM(s) IV Push every 6 hours  lactated ringers. 1000 milliLiter(s) (125 mL/Hr) IV Continuous <Continuous>  lactated ringers. 1000 milliLiter(s) (75 mL/Hr) IV Continuous <Continuous>  oxytocin Infusion 333.333 milliUNIT(s)/Min (1000 mL/Hr) IV Continuous <Continuous>    MEDICATIONS  (PRN):  diphenhydrAMINE 25 milliGRAM(s) Oral every 6 hours PRN Pruritus  lanolin Ointment 1 Application(s) Topical every 6 hours PRN Sore Nipples  magnesium hydroxide Suspension 30 milliLiter(s) Oral two times a day PRN Constipation  oxyCODONE    IR 5 milliGRAM(s) Oral every 3 hours PRN Moderate to Severe Pain (4-10)  oxyCODONE    IR 5 milliGRAM(s) Oral once PRN Moderate to Severe Pain (4-10)  simethicone 80 milliGRAM(s) Chew every 4 hours PRN Gas

## 2022-05-05 NOTE — LACTATION INITIAL EVALUATION - INTERVENTION OUTCOME
Mother and Infant roomed-in.   Mother educated on safe skin to skin care, safe sleep practices and environment. Call bell within reach./verbalizes understanding/demonstrates understanding of teaching/good return demonstration/needs met/Lactation team to follow up

## 2022-05-05 NOTE — LACTATION INITIAL EVALUATION - LACTATION INTERVENTIONS
Mom educated about babies less than 24 hours of age will be sleepy. Made aware of cluster feeding that occurs after 24 hours of life and to be cautious of sleep deprivation in order to maintain infant and mother safety. Instructed to place infant in bassinet or call for assistance if feeling sleepy or tired.Recognition of feeding cues and to feed the baby on demand based on cues at least 8-12 times in a day. Instructed pt. to wake the baby to feed if no feeding cues are seen within 3h since prior feed. Pt. educated on the nutritional needs of the baby, how many wet and dirty diapers to expect, along with the amount of times the baby needs to be placed on the breast at this time.  use  feeding log to record feedings along with wet and dirty diapers. instructed in hand expression with good return demonstration.  Reviewed safe skin to skin. Verbalized understanding of education.  Discussed  prevention  and  treatment of  sore nipples using colostrum care and/or lanolin. Reviewed proper positioning no matter what position she chooses to use: belly to belly, alignment, and supporting the head and shoulders. Instructed in hand expression with good return demonstration. + colostrum noted./initiate/review safe skin-to-skin/initiate/review hand expression/initiate/review pumping guidelines and safe milk handling/initiate/review techniques for position and latch/post discharge community resources provided/review techniques to increase milk supply/review techniques to manage sore nipples/engorgement/initiate/review breast massage/compression/initiate/review alternate feeding method/reviewed components of an effective feeding and at least 8 effective feedings per day required/reviewed importance of monitoring infant diapers, the breastfeeding log, and minimum output each day/reviewed risks of unnecessary formula supplementation/reviewed risks of artificial nipples/reviewed strategies to transition to breastfeeding only/reviewed benefits and recommendations for rooming in/reviewed feeding on demand/by cue at least 8 times a day/recommended follow-up with pediatrician within 24 hours of discharge

## 2022-05-05 NOTE — PROGRESS NOTE ADULT - SUBJECTIVE AND OBJECTIVE BOX
Pain Service Follow-up  Postop Day  1    S/P  C- Section    T(C): 36.8 (05-05-22 @ 05:31), Max: 37.4 (05-04-22 @ 11:01)  HR: 82 (05-05-22 @ 05:31) (67 - 102)  BP: 115/63 (05-05-22 @ 05:31) (98/59 - 134/81)  RR: 18 (05-05-22 @ 05:31) (12 - 19)  SpO2: 100% (05-05-22 @ 05:31) (92% - 100%)  Wt(kg): --      THERAPY:     S/P Epidural Morphine    Sedation Score:	  [X] Alert	      [  ] Drowsy       [  ] Arousable	[  ] Asleep         [  ] Unresponsive    Side Effects:	  [X] None	      [  ] Nausea       [  ] Pruritus        [  ] Weakness   [  ] Numbness        ASSESSMENT/ PLAN   [ X ] Discontinue         [  ] Continue    [ X ] Documentation and Verification of current medications       Satisfactory Post Anesthetic Course

## 2022-05-05 NOTE — PROGRESS NOTE ADULT - SUBJECTIVE AND OBJECTIVE BOX
OB Progress Note:  Delivery, POD#1    S: 27yo POD#1 s/p pLTCS for Cat II tracing, QBL = 436. Her pain is well controlled. She is tolerating a regular diet and passing flatus. Denies N/V. Denies CP/SOB/lightheadedness/dizziness.   She is ambulating without difficulty.   Brown catheter removed at 1am, due to void at 9am. Feels urge to urinate, will go to bathroom after    O:   Vital Signs Last 24 Hrs  T(C): 36.8 (05 May 2022 05:31), Max: 37.4 (04 May 2022 11:01)  T(F): 98.3 (05 May 2022 05:31), Max: 99.32 (04 May 2022 11:01)  HR: 82 (05 May 2022 05:31) (67 - 102)  BP: 115/63 (05 May 2022 05:31) (98/59 - 134/81)  BP(mean): 92 (04 May 2022 16:00) (74 - 92)  RR: 18 (05 May 2022 05:31) (12 - 19)  SpO2: 100% (05 May 2022 05:31) (92% - 100%)    Labs:  Blood type: B Positive  Rubella IgG: RPR: Negative                          10.2<L>   12.78<H> >-----------< 225    (  @ 05:38 )             32.2<L>                        10.8<L>   7.87 >-----------< 260    (  @ 18:52 )             33.2<L>                  PE:  General: NAD  Abdomen: Mildly distended, appropriately tender, incision c/d/i.  Extremities: No erythema, no pitting edema

## 2022-05-06 ENCOUNTER — TRANSCRIPTION ENCOUNTER (OUTPATIENT)
Age: 27
End: 2022-05-06

## 2022-05-06 VITALS
DIASTOLIC BLOOD PRESSURE: 78 MMHG | OXYGEN SATURATION: 100 % | RESPIRATION RATE: 17 BRPM | HEART RATE: 80 BPM | SYSTOLIC BLOOD PRESSURE: 126 MMHG | TEMPERATURE: 98 F

## 2022-05-06 RX ORDER — ACETAMINOPHEN 500 MG
3 TABLET ORAL
Qty: 0 | Refills: 0 | DISCHARGE
Start: 2022-05-06

## 2022-05-06 RX ORDER — LANOLIN
1 OINTMENT (GRAM) TOPICAL
Qty: 0 | Refills: 0 | DISCHARGE
Start: 2022-05-06

## 2022-05-06 RX ORDER — IBUPROFEN 200 MG
1 TABLET ORAL
Qty: 0 | Refills: 0 | DISCHARGE
Start: 2022-05-06

## 2022-05-06 RX ADMIN — Medication 600 MILLIGRAM(S): at 09:24

## 2022-05-06 RX ADMIN — Medication 975 MILLIGRAM(S): at 07:00

## 2022-05-06 RX ADMIN — HEPARIN SODIUM 5000 UNIT(S): 5000 INJECTION INTRAVENOUS; SUBCUTANEOUS at 10:19

## 2022-05-06 RX ADMIN — SIMETHICONE 80 MILLIGRAM(S): 80 TABLET, CHEWABLE ORAL at 09:24

## 2022-05-06 RX ADMIN — Medication 600 MILLIGRAM(S): at 09:54

## 2022-05-06 RX ADMIN — SIMETHICONE 80 MILLIGRAM(S): 80 TABLET, CHEWABLE ORAL at 03:09

## 2022-05-06 RX ADMIN — Medication 600 MILLIGRAM(S): at 03:10

## 2022-05-06 RX ADMIN — Medication 975 MILLIGRAM(S): at 11:48

## 2022-05-06 RX ADMIN — Medication 975 MILLIGRAM(S): at 06:02

## 2022-05-06 RX ADMIN — Medication 600 MILLIGRAM(S): at 04:10

## 2022-05-06 RX ADMIN — Medication 975 MILLIGRAM(S): at 12:18

## 2022-05-06 NOTE — PROGRESS NOTE ADULT - ASSESSMENT
A/P: 25yo POD#1 s/p pLTCS for Cat II tracing, QBL = 436.  Patient is stable and doing well post-operatively.    - s/p IM Methergine and TXA for uterotonics   - Continue regular diet.  - Increase ambulation.  - Continue motrin, tylenol, oxycodone PRN for pain control.    - F/u AM CBC  - f/u passage of void at 9am    Yun Maldonado MD PGY1
Assessment and Plan  POD #1 s/p P/C/S for category 2 tracing.     Doing well.  Encourage ambulation.  Incisional care and PO instructions reviewed.  CPC.     s/p IM Methergine and TXA for uterotonics   - Continue regular diet.  - Increase ambulation.  - Continue motrin, tylenol, oxycodone PRN for pain control.    -patient voided a second time but it was not measured, pending second measured void    Discharge planning    Discussed with MD Lauro Shaw  
Assessment and Plan  POD #2 s/p C/S.  Doing well and bonding with baby  Encourage ambulation.  Incisional care and PO instructions reviewed.  CPC.  Discharge home today  RTO 2 weeks for post op visit/PRN

## 2022-05-06 NOTE — DISCHARGE NOTE OB - NSDCQMCOGNITION_NEU_ALL_CORE
[facility]  Hospitalist Discharge Summary       Date of Admission:  7/4/2020  Date of Discharge:  7/5/2020  Discharging Provider: Garret Vargas MD      Discharge Diagnoses   Transient ischemic attack  Seizure disorder   History of CVA  Carotid artery stenosis   Hyponatremia  Recent left shoulder dislocation   Diabetes mellitus, type 2, on oral antidiabetic medications   Peripheral arterial disease  Coronary artery disease  Ischemic cardiomyopathy  Paroxysmal atrial fibrillation   GERD  Urinary retention   Depression  Cognitive impairment    Follow-ups Needed After Discharge   Follow-up Appointments     Follow Up and recommended labs and tests      Follow up with Nursing home physician.  No follow up labs or test are   needed. Follow up with [facility] in 2-4 weeks.             Hospital Course   Dustin Pearce is a 89 year-old male with history of stroke, seizure disorder, carotid artery stenosis, coronary artery disease with ischemic cardiomyopathy, diabetes mellitus type 2, peripheral arterial disease, paroxysmal atrial fibrillation who presents with transient left-sided weakness.  Admitted on 7/4/2020.      Transient ischemic attack  Seizure disorder   History of CVA  Carotid artery stenosis   Presents with transient left-sided weakness. Head CT with no acute porcess. CTA head/neck with multiple areas of stenosis. History of prior stroke as well as seizure disorder, other ddx for symptoms includes partial seizure.   *HgbA1c 6.3% 6/1/20  *Echocardiogram with bubble negative 4/2020, EF 30-35%, will defer repeat.   *MRI brain with no acute abnormality  - LDL 26, HDL 51. Will reduce atorvastatin to 20 mg daily   - Zio patch ordered per neurology recommendations  - PT only ordered due to observation status, recommend discharge back to TCU  - Continue prior to admission aspirin and ticagrelor pending MRI  - Permissive hypertension; PRN anti-hypertensives ordered for severe  elevations  - Continue prior to admission lacosamide, levetiracetam. Levetiracetam level therapeutic.     Hyponatremia  Mild, not likely contributing to presentation.   - Resolved 7/5 with IVF     Recent left shoulder dislocation   Currently in TCU.   - Continue scheduled acetaminophen  - Outpatient follow-up with orthopedic surgery as previously planned     Left sided chest pain  Reproducible on exam, some mild bruising left side of chest.  - Rib XR negative for fracture     Diabetes mellitus, type 2, on oral antidiabetic medications   HgbA1c 6.3% 6/2020. Prior to admission on metformin.  - Continue prior to admission metformin      Peripheral arterial disease  Coronary artery disease  Ischemic cardiomyopathy  No longer on beta blocker due to orthostatic events  - Continue prior to admission ticagrelor, aspirin  - Statin reduced as above    Paroxysmal atrial fibrillation   Noted in medical record and on prior echocardiogram 2017. Recent Zio patch with brief premature beats and atrial/ventricular runs without significant hayde or tachyarrhythmias.  - Continue DAPT       GERD  Continue prior to admission PPI     Urinary retention   Presents from facility with Luis.  - Continue prior to admission tamsulosin   - Continue Luis      Depression  Continue prior to admission duloxetine      Cognitive impairment  - Re-orient as needed  - Maintain normal day/night, sleep wake cycles  - Minimize sedating/altering medications as able  - Treat separate conditions as detailed above/below  - Discharged back to TCU     Consultations This Hospital Stay   NEUROLOGY IP CONSULT  PHYSICAL THERAPY ADULT IP CONSULT  SMOKING CESSATION PROGRAM IP CONSULT  OCCUPATIONAL THERAPY ADULT IP CONSULT  PHYSICAL THERAPY ADULT IP CONSULT    Code Status   Full Code    Time Spent on this Encounter   Garret BOYD MD, personally saw the patient today and spent greater than 30 minutes discharging this patient.       Garret Vargas MD  [place]  "[facility]  ______________________________________________________________________    Physical Exam   Vital Signs: Temp: 97.3  F (36.3  C) Temp src: Oral BP: 129/58 Pulse: 76 Heart Rate: 79 Resp: 16 SpO2: 96 % O2 Device: None (Room air)    Weight: 125 lbs 14.4 oz    Respiratory:     Clear to auscultation bilaterally, good air movement bilaterally  Cardiovascular: RRR, no m/r/g. No peripheral edema.  GI: Soft, non-tender, non-distended.   Skin/Integumen: Warm, dry  Neuro: Alert. Oriented to self, \"hospital\" (not specific one), not able to state date or reason for hospitalization. Face symmetric. Tongue midline. 5/5 upper and lower extremity strength symmetric bilaterally.         Primary Care Physician   Matt Morrissey    Discharge Disposition   Discharged to transitional care unit   Condition at discharge: Stable      Discharge Orders      General info for SNF    Length of Stay Estimate: Short Term Care: Estimated # of Days <30  Condition at Discharge: Improving  Level of care:skilled   Rehabilitation Potential: Good  Admission H&P remains valid and up-to-date: Yes  Recent Chemotherapy: N/A  Use Nursing Home Standing Orders: Yes     Mantoux instructions    Give two-step Mantoux (PPD) Per Facility Policy Yes     Reason for your hospital stay    You were hospitalized for transient left sided weakness.     Daily weights    Call Provider for weight gain of more than 2 pounds per day or 5 pounds per week.     Activity - Up with nursing assistance     Follow Up and recommended labs and tests    Follow up with Nursing home physician.  No follow up labs or test are needed. Follow up with [facility] in 2-4 weeks.     Full Code     Occupational Therapy Adult Consult    Evaluate and treat as clinically indicated.    Reason:   Physical deconditioning     Physical Therapy Adult Consult    Evaluate and treat as clinically indicated.    Reason:  Physical deconditioning     Fall precautions     Advance " Diet as Tolerated    Follow this diet upon discharge: Moderate consistent carbohydrate diet     Discharge Medications   Current Discharge Medication List      CONTINUE these medications which have CHANGED    Details   atorvastatin (LIPITOR) 20 MG tablet Take 1 tablet (20 mg) by mouth At Bedtime    Associated Diagnoses: PAD (peripheral artery disease) (H)         CONTINUE these medications which have NOT CHANGED    Details   acetaminophen (TYLENOL) 500 MG tablet Take 1,000 mg by mouth 2 times daily And 1000mg daily prn also.     for pain      aspirin (ASA) 81 MG EC tablet Take 1 tablet (81 mg) by mouth daily  Qty: 30 tablet, Refills: 0    Associated Diagnoses: Transient cerebral ischemia, unspecified type      DULoxetine (CYMBALTA) 30 MG capsule TAKE 1 CAPSULE(30 MG) BY MOUTH DAILY  Qty: 30 capsule, Refills: 0    Associated Diagnoses: Polyneuropathy associated with underlying disease (H)      ipratropium (ATROVENT) 0.03 % nasal spray INHALE 1 SPRAY IN EACH NOSTRIL EVERY 12 HOURS AS NEEDED  Qty: 30 mL, Refills: 11    Associated Diagnoses: Runny nose      Lacosamide (VIMPAT) 100 MG TABS tablet Take 1 tablet (100 mg) by mouth 2 times daily . Future refills by PCP Dr. Matt Morrissey with phone number [phone].  Qty: 60 tablet, Refills: 0    Associated Diagnoses: Seizure (H)      levETIRAcetam (KEPPRA) 750 MG tablet Take 1 tablet (750 mg) by mouth 2 times daily  Qty: 60 tablet, Refills: 0    Associated Diagnoses: Seizure (H)      magnesium oxide (MAG-OX) 400 MG tablet Take 400 mg by mouth 4 times daily (with meals and nightly)      metFORMIN (GLUCOPHAGE) 1000 MG tablet Take 1 tablet (1,000 mg) by mouth 2 times daily (with meals)  Qty: 60 tablet, Refills: 0    Associated Diagnoses: DM type 2 with diabetic peripheral neuropathy (H)      nitroGLYcerin (NITROSTAT) 0.4 MG sublingual tablet For chest pain place 1 tablet under the tongue every 5 minutes for 3 doses. If symptoms persist 5 minutes after 1st dose call  [phone].  Qty: 25 tablet, Refills: 0    Associated Diagnoses: Coronary artery disease involving native heart, angina presence unspecified, unspecified vessel or lesion type      omeprazole (PRILOSEC) 40 MG DR capsule TAKE 1 CAPSULE(40 MG) BY MOUTH DAILY 30 TO 60 MINUTES BEFORE A MEAL  Qty: 30 capsule, Refills: 0    Associated Diagnoses: Other acute gastritis without hemorrhage      !! polyethylene glycol-propylene glycol (SYSTANE ULTRA) 0.4-0.3 % SOLN ophthalmic solution Place 1 drop into both eyes daily as needed for dry eyes      !! polyethylene glycol-propylene glycol (SYSTANE ULTRA) 0.4-0.3 % SOLN ophthalmic solution Place 2 drops into both eyes every evening Dx: Dry Eyes      sennosides (SENOKOT) 8.6 MG tablet Take 2 tablets by mouth daily Hold for loose stools      tamsulosin (FLOMAX) 0.4 MG capsule Take 1 capsule (0.4 mg) by mouth daily  Qty: 30 capsule, Refills: 0    Associated Diagnoses: Benign non-nodular prostatic hyperplasia with lower urinary tract symptoms      ticagrelor (BRILINTA) 90 MG tablet Take 1 tablet (90 mg) by mouth 2 times daily  Qty: 60 tablet, Refills: 0    Associated Diagnoses: TIA (transient ischemic attack)      Blood Glucose Monitoring Suppl FROYLAN 4 times daily (before meals and nightly)      Nutritional Supplements (NUTRITIONAL SUPPLEMENT PO) Diabetic HNS 8 oz BID btwn meals       !! - Potential duplicate medications found. Please discuss with provider.          Significant Results and Procedures   Most Recent 3 CBC's:  Recent Labs   Lab Test 07/04/20  0403 06/11/20  0144 06/04/20  0730   WBC 10.7 8.3 8.6   HGB 11.8* 11.5* 10.3*   MCV 91 89 91    267 172     Most Recent 3 BMP's:  Recent Labs   Lab Test 07/05/20  0845 07/04/20  0403 06/11/20  0144    132* 136   POTASSIUM 4.1 4.6 4.4   CHLORIDE 101 97 102   CO2 29 27 26   BUN 12 22 23   CR 0.64* 0.72 0.76   ANIONGAP 4 8 8   ALLISON 8.4* 9.1 9.3   * 155* 198*     Most Recent 2 LFT's:  Recent Labs   Lab Test 07/04/20  0403  06/01/20  0336   AST 25 36   ALT 22 28   ALKPHOS 95 125   BILITOTAL 0.6 0.6     Most Recent 3 INR's:  Recent Labs   Lab Test 07/04/20  0403 05/01/20  1005 04/18/20  1944   INR 1.02 1.03 1.08     Most Recent 3 Troponin's:  Recent Labs   Lab Test 06/11/20  0144 05/01/20  1005 04/19/20  0720  05/27/18  0119   TROPI 0.038 0.016 0.034   < >  --    TROPONIN  --   --   --   --  0.00    < > = values in this interval not displayed.     Most Recent 3 BNP's:  Recent Labs   Lab Test 06/01/18  0810   NTBNPI 10,184*     Most Recent Cholesterol Panel:  Recent Labs   Lab Test 07/05/20  0845   CHOL 97   LDL 26   HDL 51   TRIG 99     Most Recent 6 Bacteria Isolates From Any Culture (See EPIC Reports for Culture Details):  Recent Labs   Lab Test 05/01/20  1300 05/01/20  1020 05/01/20  1005 05/26/18  2246 05/26/18  2135 03/12/18  1454   CULT No growth No growth No growth No growth No growth  No growth >100,000 colonies/mL  Enterococcus faecalis  *     Most Recent TSH and T4:  Recent Labs   Lab Test 06/03/18  0520   TSH 4.45*   T4 0.98     Most Recent Hemoglobin A1c:  Recent Labs   Lab Test 06/01/20  1022   A1C 6.3*     Most Recent Urinalysis:  Recent Labs   Lab Test 06/11/20  1335  03/26/18  1057   COLOR Yellow   < > Yellow   APPEARANCE Clear   < > Slightly Cloudy   URINEGLC Negative   < > Negative   URINEBILI Negative   < > Negative   URINEKETONE 10*   < > Trace*   SG 1.024   < > 1.020   UBLD Negative   < > Negative   URINEPH 5.5   < > 5.5   PROTEIN 10*   < > 30*   UROBILINOGEN  --   --  0.2   NITRITE Negative   < > Negative   LEUKEST Negative   < > Negative   RBCU <1   < >  --    WBCU 2   < >  --     < > = values in this interval not displayed.     Most Recent ABG:No lab results found.  Most Recent ESR & CRP:  Recent Labs   Lab Test 05/01/20  1005  05/31/17  1800   SED  --   --  43*   CRP 6.8   < > 21.2*    < > = values in this interval not displayed.   ,   Results for orders placed or performed during the hospital encounter of  07/04/20   Head CT w/o contrast    Narrative    EXAM: CT HEAD W/O CONTRAST  LOCATION: [facility]  DATE/TIME: 7/4/2020 4:57 AM    INDICATION: Head injury  COMPARISON: 06/11/2020  TECHNIQUE: Routine without IV contrast. Multiplanar reformats. Dose reduction techniques were used.    FINDINGS:  INTRACRANIAL CONTENTS: No intracranial hemorrhage, extraaxial collection, or mass effect.  No CT evidence of acute infarct. Moderate presumed chronic small vessel ischemic changes. Moderate generalized volume loss. No hydrocephalus. Moderate area of   encephalomalacia left frontal lobe.     VISUALIZED ORBITS/SINUSES/MASTOIDS: No intraorbital abnormality. No paranasal sinus mucosal disease. No middle ear or mastoid effusion.    BONES/SOFT TISSUES: No acute abnormality.      Impression    IMPRESSION:  1.  No acute intracranial process.   CTA Head Neck with Contrast    Narrative    EXAM: CTA  HEAD NECK WITH CONTRAST  LOCATION: [facility]  DATE/TIME: 7/4/2020 5:55 AM    INDICATION: Transient left-sided weakness.  COMPARISON: None.  CONTRAST: 70mL [id number]  TECHNIQUE: Head and neck CT angiogram with IV contrast. Axial helical CT images of the head and neck vessels obtained during the arterial phase of intravenous contrast administration. Axial 2D reconstructed images and multiplanar 3D MIP reconstructed   images of the head and neck vessels were performed by the technologist. Dose reduction techniques were used. All stenosis measurements made according to NASCET criteria unless otherwise specified.    FINDINGS:   HEAD CTA:  ANTERIOR CIRCULATION: No stenosis/occlusion, aneurysm, or high flow vascular malformation. Standard [place] anatomy.    POSTERIOR CIRCULATION:  Left P2 segment severe stenosis with poststenotic dilatation measuring 2 mm.    Right P2 segment mild stenosis.    Otherwise, no significant stenosis/occlusion, aneurysm, or high flow vascular malformation. Dominant right and smaller  left vertebral artery contribute to a normal basilar artery.     DURAL VENOUS SINUSES: Not well evaluated on a technical basis.      NECK CTA:  RIGHT CAROTID:   Right carotid endarterectomy likely present.    Right distal cervical ICA is tortuous.    Right distal cervical ICA just inferior to the skull base demonstrate 50% stenosis based upon NASCET criteria.    Otherwise, no measurable stenosis or dissection.    LEFT CAROTID:   Left common carotid artery origin moderate to severe stenosis.    Left mid and distal common carotid artery demonstrate moderate stenosis.    Left carotid bifurcation moderate stenosis.    Left carotid bulb, left cervical ICA, and left ICA at the skull base is occluded with reconstitution at the left distal supraclinoid ICA and left carotid terminus.      VERTEBRAL ARTERIES:  Right mid to distal V2 segment moderate stenosis likely due to extrinsic compression from spondylosis.     Otherwise, no significant stenosis/occlusion or dissection. Dominant right and smaller left vertebral arteries.    AORTIC ARCH: Classic aortic arch anatomy. Left mid subclavian artery severe stenosis. Right proximal brachiocephalic artery mild stenosis unlikely be rate limiting.    ARTERIAL PLAQUE: Extensive calcified/noncalcified plaque with multifocal small and prominent ulcerations noted within the aorta, left subclavian artery, right brachiocephalic artery, right subclavian artery, bilateral extracranial carotid arteries   (including but not limited to the carotid bifurcations and carotid bulbs), bilateral ICAs at the skull base, bilateral V4 segments.    NONVASCULAR STRUCTURES:   Dental amalgam resulting in streak artifact.     Emphysema.    Diffuse bony demineralization.    Degenerative changes noted within the spine.        Impression    IMPRESSION:   HEAD CTA:   1.  Left P2 segment severe stenosis with poststenotic dilatation measuring 2 mm.  2.  Right P2 segment mild stenosis.  3.  Remaining proximal  intracranial arteries demonstrate no significant stenosis or occlusion.       NECK CTA:  1.  Left carotid bulb, left cervical ICA, and left ICA at the skull base is occluded with reconstitution at the left distal supraclinoid ICA and left carotid terminus.  2.  Left common carotid artery origin moderate to severe stenosis.  3.  Left mid and distal common carotid artery demonstrate moderate stenosis.  4.  Left carotid bifurcation moderate stenosis.  5.  Right distal cervical ICA just inferior to the skull base demonstrate 50% stenosis based upon NASCET criteria.  6.  Right mid to distal V2 segment moderate stenosis likely due to extrinsic compression from spondylosis.     MRI Brain w & w/o contrast    Narrative    MRI BRAIN WITHOUT AND WITH CONTRAST July 5, 2020 10:47 AM     HISTORY: Left-sided weakness.     TECHNIQUE: Multiplanar, multisequence MRI of the brain without and  with contrast.     COMPARISON: CT head 7/4/2020. MRI head 6/3/2020.    FINDINGS: No abnormal intracranial restricted diffusion to suggest  acute infarct. There is an unchanged moderate sized chronic left  middle cerebral artery territory infarct primarily involving the left  frontal lobe/frontal opercular region and left anterior insula.  Unchanged multiple small chronic right cerebellar hemisphere infarcts.  Moderate generalized brain parenchymal volume loss. Diffuse moderate  to severe confluent areas of T2/T2 FLAIR hyperintense signal in the  subcortical/deep and periventricular cerebral white matter and  extending along the bilateral basal nuclei regions and within the  wade, likely related to advanced chronic small vessel ischemic  disease. No acute intracranial hemorrhage, extraaxial fluid  collection, mass lesion or mass effect. The ventricles are normal in  size and configuration. No abnormal intracranial postcontrast  enhancement is evident.    Bilateral lens replacements. Visualized orbits are otherwise normal.  Mild mucosal thickening  "in the bilateral ethmoid air cells. The left  internal carotid artery flow-void is absent, as before. The other  major vascular flow voids at the skull base appear grossly maintained.  The calvarium, skull base and midface otherwise appear grossly  unremarkable. Unchanged osseous fusion across the posterior aspect of  the C2 and C3 vertebral bodies, and also involving the C2-C3 posterior  elements bilaterally.      Impression    IMPRESSION:  1. No acute intracranial abnormality.  2. Unchanged moderate-sized chronic left fronto-insular ischemic  infarct and small right cerebellar hemisphere infarct.  3. Moderate global brain parenchymal volume loss and moderate to  severe presumed chronic small vessel ischemic disease, as before.  4. Unchanged chronic left internal carotid artery occlusion.   XR Ribs & Chest Left G/E 3 Views    Narrative    EXAM: XR RIBS and CHEST LT 3VW  LOCATION: [facility]  DATE/TIME: 7/4/2020 3:59 PM    INDICATION: Recent fall left-sided chest pain evaluate for rib fracture  COMPARISON: None.      Impression    IMPRESSION: No evidence for acute displaced rib fracture involving the left lower chest. No pneumothorax. Normal heart size. Lungs clear. Atherosclerotic vascular calcification thoracic aorta. Degenerative changes in the spine.       Allergies   Allergies   Allergen Reactions     Oxycodone Other (See Comments)     \"TERRIBLE SWEATING\"     Sulfa Drugs      Tetracycline        " No difficulties

## 2022-05-06 NOTE — DISCHARGE NOTE OB - PATIENT PORTAL LINK FT
You can access the FollowMyHealth Patient Portal offered by [facility] by registering at the following website: [email or web address]. By joining [facility]’s FollowMyHealth portal, you will also be able to view your health information using other applications (apps) compatible with our system.

## 2022-05-06 NOTE — DISCHARGE NOTE OB - NS MD DC FALL RISK RISK
For information on Fall & Injury Prevention, visit: [email or web address] OR  [email or web address] OR  https://www.cdc.gov/steadi/patient.html

## 2022-05-06 NOTE — PROGRESS NOTE ADULT - SUBJECTIVE AND OBJECTIVE BOX
Post-Operative Note, C/S  She is a  26y woman who is now post-operative day: 2    Subjective:  The patient feels well.  She is ambulating.   She is tolerating regular diet.  She denies nausea and vomiting; denies fever.  She is voiding.  Her pain is controlled; incisional pain is appropriate.  She reports normal postpartum bleeding.  She is breastfeeding.    Physical exam:    Vital Signs Last 24 Hrs  T(C): 36.6 (06 May 2022 05:18), Max: 36.8 (05 May 2022 14:04)  T(F): 97.9 (06 May 2022 05:18), Max: 98.3 (05 May 2022 14:04)  HR: 97 (06 May 2022 05:18) (81 - 104)  BP: 101/50 (06 May 2022 05:18) (101/50 - 116/74)  BP(mean): --  RR: 18 (06 May 2022 05:18) (16 - 18)  SpO2: 100% (06 May 2022 05:18) (98% - 100%)    Gen: NAD  Breast: Soft, nontender, not engorged.  Abdomen: Soft, nontender, no distension , firm uterine fundus at umbilicus.  Incision: C/D/I.  Pelvic: Normal lochia noted  Ext: No calf tenderness    LABS:                        10.2   12.78 )-----------( 225      ( 05 May 2022 05:38 )             32.2         Allergies    No Known Allergies      MEDICATIONS  (STANDING):  acetaminophen     Tablet .. 975 milliGRAM(s) Oral <User Schedule>  diphtheria/tetanus/pertussis (acellular) Vaccine (ADAcel) 0.5 milliLiter(s) IntraMuscular once  heparin   Injectable 5000 Unit(s) SubCutaneous every 12 hours  ibuprofen  Tablet. 600 milliGRAM(s) Oral every 6 hours  lactated ringers. 1000 milliLiter(s) (125 mL/Hr) IV Continuous <Continuous>    MEDICATIONS  (PRN):  diphenhydrAMINE 25 milliGRAM(s) Oral every 6 hours PRN Pruritus  lanolin Ointment 1 Application(s) Topical every 6 hours PRN Sore Nipples  magnesium hydroxide Suspension 30 milliLiter(s) Oral two times a day PRN Constipation  oxyCODONE    IR 5 milliGRAM(s) Oral every 3 hours PRN Moderate to Severe Pain (4-10)  oxyCODONE    IR 5 milliGRAM(s) Oral once PRN Moderate to Severe Pain (4-10)  simethicone 80 milliGRAM(s) Chew every 4 hours PRN Gas

## 2022-05-06 NOTE — DISCHARGE NOTE OB - CARE PROVIDER_API CALL
Kirby Bender)  Obstetrics and Gynecology  [address] [place] [address]  Phone: [phone]  Fax: [phone]  Follow Up Time:

## 2022-05-25 ENCOUNTER — NON-APPOINTMENT (OUTPATIENT)
Age: 27
End: 2022-05-25

## 2023-01-04 ENCOUNTER — NON-APPOINTMENT (OUTPATIENT)
Age: 28
End: 2023-01-04

## 2023-03-31 NOTE — OB PROVIDER TRIAGE NOTE - NS_FHRLOC_OBGYN_ALL_OB
following concern(s):Shortness of Breath, COPD, and Hypertension (Needs order for oxygen)      Comes with her daughter, Tanja Epstein. She is mainly here due to renewal of her oxygen order. She is on oxygen now 1 l/min 3-4 hrs a day, but has not been using it much. Her daughter says at dialysis she was told to use it at home when the BPs goes down and she is short of breath. When was in nursing home was on oxygen at 3-4 l/mins  Her daughter says she still has the machine and the oxygen tank. Needs renewal   Fluctuating pulse ox  SpO2 Readings from Last 10 Encounters:   03/06/23 98%   01/27/23 94%   11/25/22 91%   11/11/22 98%   07/29/22 96%   07/26/22 100%   07/18/22 97%   06/15/22 94%   06/13/22 94%   06/11/22 94%     Patient is currently going through the wound care, she had open blisters on her legs, her daughter says they are healing, she has one on each leg  Had testing and told to see vascular, she says she has appointment at [facility] with [facility]  Peripheral vascular disease, worse in the right leg, described as severe, and moderate on the left leg. Summary        RIght DARCY of 0.4 is consistent with severe arterial occlusive disease. Left DARCY of 0.71 is consistent with moderate arterial occlusive disease. Signature        ----------------------------------------------------------------    Electronically signed by Tobias Rodriguez(Sonographer) on    03/22/2023 06:14 PM    ----------------------------------------------------------------       Has end-stage kidney disease  Her daughter says she got first part of the fistula and will see Dr. Saman Montero at [facility], for the second part of the fistula, so she will start the dialysis through the fistula soon. Hypertension, congestive heart failure, coronary artery disease s/p CABG and stents:    she  is not exercising and is adherent to low salt diet.   Blood pressure fluctuates, going low after dialysis, when she takes midodrine    Cardiac RLQ

## 2023-06-15 ENCOUNTER — EMERGENCY (EMERGENCY)
Facility: HOSPITAL | Age: 28
LOS: 1 days | Discharge: HOME | End: 2023-06-15
Attending: STUDENT IN AN ORGANIZED HEALTH CARE EDUCATION/TRAINING PROGRAM
Payer: MEDICAID

## 2023-06-15 VITALS
OXYGEN SATURATION: 100 % | TEMPERATURE: 98 F | DIASTOLIC BLOOD PRESSURE: 80 MMHG | SYSTOLIC BLOOD PRESSURE: 120 MMHG | RESPIRATION RATE: 18 BRPM | HEART RATE: 76 BPM

## 2023-06-15 VITALS
WEIGHT: 164.91 LBS | HEIGHT: 65 IN | RESPIRATION RATE: 17 BRPM | OXYGEN SATURATION: 100 % | DIASTOLIC BLOOD PRESSURE: 85 MMHG | SYSTOLIC BLOOD PRESSURE: 127 MMHG | HEART RATE: 92 BPM

## 2023-06-15 LAB
ALBUMIN SERPL ELPH-MCNC: 4.1 G/DL — SIGNIFICANT CHANGE UP (ref 3.5–5)
ALP SERPL-CCNC: 106 U/L — SIGNIFICANT CHANGE UP (ref 40–120)
ALT FLD-CCNC: 15 U/L DA — SIGNIFICANT CHANGE UP (ref 10–60)
ANION GAP SERPL CALC-SCNC: 4 MMOL/L — LOW (ref 5–17)
AST SERPL-CCNC: 11 U/L — SIGNIFICANT CHANGE UP (ref 10–40)
BASOPHILS # BLD AUTO: 0.05 K/UL — SIGNIFICANT CHANGE UP (ref 0–0.2)
BASOPHILS NFR BLD AUTO: 0.5 % — SIGNIFICANT CHANGE UP (ref 0–2)
BILIRUB SERPL-MCNC: 0.5 MG/DL — SIGNIFICANT CHANGE UP (ref 0.2–1.2)
BUN SERPL-MCNC: 10 MG/DL — SIGNIFICANT CHANGE UP (ref 7–18)
CALCIUM SERPL-MCNC: 9.6 MG/DL — SIGNIFICANT CHANGE UP (ref 8.4–10.5)
CHLORIDE SERPL-SCNC: 106 MMOL/L — SIGNIFICANT CHANGE UP (ref 96–108)
CO2 SERPL-SCNC: 27 MMOL/L — SIGNIFICANT CHANGE UP (ref 22–31)
CREAT SERPL-MCNC: 0.81 MG/DL — SIGNIFICANT CHANGE UP (ref 0.5–1.3)
D DIMER BLD IA.RAPID-MCNC: <150 NG/ML DDU — SIGNIFICANT CHANGE UP
EGFR: 101 ML/MIN/1.73M2 — SIGNIFICANT CHANGE UP
EOSINOPHIL # BLD AUTO: 0.09 K/UL — SIGNIFICANT CHANGE UP (ref 0–0.5)
EOSINOPHIL NFR BLD AUTO: 0.9 % — SIGNIFICANT CHANGE UP (ref 0–6)
GLUCOSE SERPL-MCNC: 103 MG/DL — HIGH (ref 70–99)
HCG UR QL: NEGATIVE — SIGNIFICANT CHANGE UP
HCT VFR BLD CALC: 44.5 % — SIGNIFICANT CHANGE UP (ref 34.5–45)
HGB BLD-MCNC: 14.4 G/DL — SIGNIFICANT CHANGE UP (ref 11.5–15.5)
IMM GRANULOCYTES NFR BLD AUTO: 0.3 % — SIGNIFICANT CHANGE UP (ref 0–0.9)
LYMPHOCYTES # BLD AUTO: 2.61 K/UL — SIGNIFICANT CHANGE UP (ref 1–3.3)
LYMPHOCYTES # BLD AUTO: 24.8 % — SIGNIFICANT CHANGE UP (ref 13–44)
MCHC RBC-ENTMCNC: 26.2 PG — LOW (ref 27–34)
MCHC RBC-ENTMCNC: 32.4 GM/DL — SIGNIFICANT CHANGE UP (ref 32–36)
MCV RBC AUTO: 81.1 FL — SIGNIFICANT CHANGE UP (ref 80–100)
MONOCYTES # BLD AUTO: 0.85 K/UL — SIGNIFICANT CHANGE UP (ref 0–0.9)
MONOCYTES NFR BLD AUTO: 8.1 % — SIGNIFICANT CHANGE UP (ref 2–14)
NEUTROPHILS # BLD AUTO: 6.89 K/UL — SIGNIFICANT CHANGE UP (ref 1.8–7.4)
NEUTROPHILS NFR BLD AUTO: 65.4 % — SIGNIFICANT CHANGE UP (ref 43–77)
NRBC # BLD: 0 /100 WBCS — SIGNIFICANT CHANGE UP (ref 0–0)
PLATELET # BLD AUTO: 345 K/UL — SIGNIFICANT CHANGE UP (ref 150–400)
POTASSIUM SERPL-MCNC: 3.9 MMOL/L — SIGNIFICANT CHANGE UP (ref 3.5–5.3)
POTASSIUM SERPL-SCNC: 3.9 MMOL/L — SIGNIFICANT CHANGE UP (ref 3.5–5.3)
PROT SERPL-MCNC: 8.1 G/DL — SIGNIFICANT CHANGE UP (ref 6–8.3)
RBC # BLD: 5.49 M/UL — HIGH (ref 3.8–5.2)
RBC # FLD: 13.2 % — SIGNIFICANT CHANGE UP (ref 10.3–14.5)
SODIUM SERPL-SCNC: 137 MMOL/L — SIGNIFICANT CHANGE UP (ref 135–145)
TROPONIN I, HIGH SENSITIVITY RESULT: 19.6 NG/L — SIGNIFICANT CHANGE UP
WBC # BLD: 10.52 K/UL — HIGH (ref 3.8–10.5)
WBC # FLD AUTO: 10.52 K/UL — HIGH (ref 3.8–10.5)

## 2023-06-15 PROCEDURE — 93010 ELECTROCARDIOGRAM REPORT: CPT

## 2023-06-15 PROCEDURE — 85025 COMPLETE CBC W/AUTO DIFF WBC: CPT

## 2023-06-15 PROCEDURE — 99285 EMERGENCY DEPT VISIT HI MDM: CPT

## 2023-06-15 PROCEDURE — 36415 COLL VENOUS BLD VENIPUNCTURE: CPT

## 2023-06-15 PROCEDURE — 96374 THER/PROPH/DIAG INJ IV PUSH: CPT

## 2023-06-15 PROCEDURE — 71046 X-RAY EXAM CHEST 2 VIEWS: CPT

## 2023-06-15 PROCEDURE — 84484 ASSAY OF TROPONIN QUANT: CPT

## 2023-06-15 PROCEDURE — 71046 X-RAY EXAM CHEST 2 VIEWS: CPT | Mod: 26

## 2023-06-15 PROCEDURE — 80053 COMPREHEN METABOLIC PANEL: CPT

## 2023-06-15 PROCEDURE — 93005 ELECTROCARDIOGRAM TRACING: CPT

## 2023-06-15 PROCEDURE — 81025 URINE PREGNANCY TEST: CPT

## 2023-06-15 PROCEDURE — 85379 FIBRIN DEGRADATION QUANT: CPT

## 2023-06-15 PROCEDURE — 99285 EMERGENCY DEPT VISIT HI MDM: CPT | Mod: 25

## 2023-06-15 RX ORDER — KETOROLAC TROMETHAMINE 30 MG/ML
30 SYRINGE (ML) INJECTION ONCE
Refills: 0 | Status: DISCONTINUED | OUTPATIENT
Start: 2023-06-15 | End: 2023-06-15

## 2023-06-15 RX ADMIN — Medication 30 MILLIGRAM(S): at 14:27

## 2023-06-15 RX ADMIN — Medication 30 MILLIGRAM(S): at 15:27

## 2023-06-15 NOTE — ED PROVIDER NOTE - OBJECTIVE STATEMENT
27 year old female with no past medical history presents with sternal chest pain that began last night with associated SOB, pain on inhalation and dizziness. Has not taken any medications for symptoms. Not on birth control, no recent surgery or travel. Denies fevers, cough, n/v/d.

## 2023-06-15 NOTE — ED ADULT NURSE NOTE - OBJECTIVE STATEMENT
States she has chest pain and can't take deep breaths since last night .Denies recent long drives , or using birth control pills .

## 2023-06-15 NOTE — ED PROVIDER NOTE - CONSTITUTIONAL, MLM
Well appearing, awake, alert, oriented to person, place, time/situation and in no apparent distress. Anxious normal...

## 2023-06-15 NOTE — ED PROVIDER NOTE - PROGRESS NOTE DETAILS
Labs WNL. CXR normal. Will dc home with PCP follow up. Pt is well appearing walking with steady gait, stable for discharge and follow up without fail with medical doctor. I had a detailed discussion with the patient and/or guardian regarding the historical points, exam findings, and any diagnostic results supporting the discharge diagnosis. Pt educated on care and need for follow up. Strict return instructions and red flag signs and symptoms discussed with patient. Questions answered. Pt shows understanding of discharge information and agrees to follow.

## 2023-06-15 NOTE — ED PROVIDER NOTE - PATIENT PORTAL LINK FT
You can access the FollowMyHealth Patient Portal offered by [facility] by registering at the following website: [email or web address]. By joining [employer]’s FollowMyHealth portal, you will also be able to view your health information using other applications (apps) compatible with our system.

## 2023-06-15 NOTE — ED ADULT NURSE NOTE - NSFALLUNIVINTERV_ED_ALL_ED
Bed/Stretcher in lowest position, wheels locked, appropriate side rails in place/Call bell, personal items and telephone in reach/Instruct patient to call for assistance before getting out of bed/chair/stretcher/Non-slip footwear applied when patient is off stretcher/[place] to call system/Physically safe environment - no spills, clutter or unnecessary equipment/Purposeful proactive rounding/Room/bathroom lighting operational, light cord in reach

## 2023-06-15 NOTE — ED PROVIDER NOTE - CLINICAL SUMMARY MEDICAL DECISION MAKING FREE TEXT BOX
27 year old female with sternal chest pain that began last night. Will obtain labs, urine, CXR, EKG to r/o ACS.

## 2023-06-15 NOTE — ED ADULT NURSE NOTE - CAS DISCH CONDITION
I have personally performed a face to face diagnostic evaluation on this patient. I have reviewed the ACP note and agree with the history, exam and plan of care, except as noted.
Stable

## 2023-07-05 NOTE — DISCHARGE NOTE OB - MEDICATION SUMMARY - MEDICATIONS TO CHANGE
I will SWITCH the dose or number of times a day I take the medications listed below when I get home from the hospital:  None Stable

## 2023-08-11 NOTE — ED PROVIDER NOTE - PRO INTERPRETER NEED 2
English
I have personally performed a face to face medical and diagnostic evaluation of the patient. I have discussed with and reviewed the Resident's and/or ACP's and/or Medical/PA/NP student's note and agree with the History, ROS, Physical Exam and MDM unless otherwise indicated. A brief summary of my personal evaluation and impression can be found below.    Bogdan SWEENEY: 28-year-old female history of developmental delay diabetes asthma presents with a chief complaint of pain "inside "when she goes to [facility], was sent in by group home for further evaluation, occurred once time today with bowel movement earlier this evening, she denies any bleeding nausea vomiting fever chills chest pain or trouble breathing no bleeding.  Reports in the ED that she is hungry and she wants a sandwich.  No abdominal pain.    All other ROS negative, except as above and as per HPI and ROS section.    VITALS: Initial triage and subsequent vitals have been reviewed by me.  GEN APPEARANCE: Alert, non-toxic, well-appearing, NAD.  HEAD: Atraumatic.  EYES: PERRLa, EOMI, vision grossly intact.   NECK: Supple  CV: warm well perfused   LUNGS: no resp distress   ABDOMEN: Soft, NTND. No guarding or rebound.   MSK/EXT: No spinal or extremity point tenderness. No CVA ttp. Pelvis stable. No peripheral edema.  NEURO: Alert, follows commands. Weight bearing normal. Speech normal. Sensation and motor normal x4 extremities.   SKIN: Warm, dry and intact. No rash.  PSYCH: Appropriate  RECTAL EXAM with Maulik SWEENEY and Susanne MS4, no e/o hemorrhoid bleeding or fissure no rash     Plan/MDM: Exam vital signs stable nontoxic-appearing physical exam as above, DDx concern for rectal pain, possibly secondary to constipation, patient denies insertion of foreign body, reassuring exam no evidence of hemorrhoid bleeding rash or fissure, patient is requesting food on ED arrival, will give p.o., reassess monitor anticipate discharge back to group home facility.

## 2025-07-24 ENCOUNTER — APPOINTMENT (OUTPATIENT)
Dept: ANTEPARTUM | Facility: CLINIC | Age: 30
End: 2025-07-24
Payer: MEDICAID

## 2025-07-24 ENCOUNTER — APPOINTMENT (OUTPATIENT)
Dept: OBGYN | Facility: CLINIC | Age: 30
End: 2025-07-24
Payer: MEDICAID

## 2025-07-24 ENCOUNTER — ASOB RESULT (OUTPATIENT)
Age: 30
End: 2025-07-24

## 2025-07-24 VITALS
WEIGHT: 155 LBS | BODY MASS INDEX: 24.91 KG/M2 | DIASTOLIC BLOOD PRESSURE: 73 MMHG | SYSTOLIC BLOOD PRESSURE: 116 MMHG | HEIGHT: 66 IN

## 2025-07-24 DIAGNOSIS — Z3A.30 30 WEEKS GESTATION OF PREGNANCY: ICD-10-CM

## 2025-07-24 PROCEDURE — 99202 OFFICE O/P NEW SF 15 MIN: CPT | Mod: TH

## 2025-07-24 PROCEDURE — 76819 FETAL BIOPHYS PROFIL W/O NST: CPT

## 2025-07-24 PROCEDURE — 76805 OB US >/= 14 WKS SNGL FETUS: CPT

## 2025-07-27 LAB
ALBUMIN SERPL ELPH-MCNC: 3.7 G/DL
ALP BLD-CCNC: 93 U/L
ALT SERPL-CCNC: 10 U/L
ANION GAP SERPL CALC-SCNC: 13 MMOL/L
AST SERPL-CCNC: 18 U/L
BASOPHILS # BLD AUTO: 0.01 K/UL
BASOPHILS NFR BLD AUTO: 0.2 %
BILIRUB SERPL-MCNC: 0.2 MG/DL
BUN SERPL-MCNC: 8 MG/DL
CALCIUM SERPL-MCNC: 9.1 MG/DL
CHLORIDE SERPL-SCNC: 104 MMOL/L
CO2 SERPL-SCNC: 20 MMOL/L
CREAT SERPL-MCNC: 0.57 MG/DL
EGFRCR SERPLBLD CKD-EPI 2021: 125 ML/MIN/1.73M2
EOSINOPHIL # BLD AUTO: 0.04 K/UL
EOSINOPHIL NFR BLD AUTO: 0.7 %
FERRITIN SERPL-MCNC: 37 NG/ML
GLUCOSE 1H P 50 G GLC PO SERPL-MCNC: 113 MG/DL
GLUCOSE SERPL-MCNC: 114 MG/DL
HCT VFR BLD CALC: 34.2 %
HGB BLD-MCNC: 11.2 G/DL
IMM GRANULOCYTES NFR BLD AUTO: 0.3 %
LYMPHOCYTES # BLD AUTO: 1.08 K/UL
LYMPHOCYTES NFR BLD AUTO: 18.8 %
MAN DIFF?: NORMAL
MCHC RBC-ENTMCNC: 26.6 PG
MCHC RBC-ENTMCNC: 32.7 G/DL
MCV RBC AUTO: 81.2 FL
MONOCYTES # BLD AUTO: 0.64 K/UL
MONOCYTES NFR BLD AUTO: 11.2 %
NEUTROPHILS # BLD AUTO: 3.94 K/UL
NEUTROPHILS NFR BLD AUTO: 68.8 %
PLATELET # BLD AUTO: 199 K/UL
POTASSIUM SERPL-SCNC: 3.8 MMOL/L
PROT SERPL-MCNC: 6 G/DL
RBC # BLD: 4.21 M/UL
RBC # FLD: 14.2 %
SODIUM SERPL-SCNC: 137 MMOL/L
WBC # FLD AUTO: 5.73 K/UL

## 2025-08-06 ENCOUNTER — NON-APPOINTMENT (OUTPATIENT)
Age: 30
End: 2025-08-06

## 2025-08-07 ENCOUNTER — APPOINTMENT (OUTPATIENT)
Dept: ANTEPARTUM | Facility: CLINIC | Age: 30
End: 2025-08-07

## 2025-08-07 ENCOUNTER — APPOINTMENT (OUTPATIENT)
Dept: OBGYN | Facility: CLINIC | Age: 30
End: 2025-08-07
Payer: MEDICAID

## 2025-08-07 ENCOUNTER — ASOB RESULT (OUTPATIENT)
Age: 30
End: 2025-08-07

## 2025-08-07 VITALS — DIASTOLIC BLOOD PRESSURE: 72 MMHG | SYSTOLIC BLOOD PRESSURE: 107 MMHG

## 2025-08-07 VITALS — BODY MASS INDEX: 25.82 KG/M2 | WEIGHT: 160 LBS

## 2025-08-07 PROCEDURE — 76821 MIDDLE CEREBRAL ARTERY ECHO: CPT

## 2025-08-07 PROCEDURE — 99212 OFFICE O/P EST SF 10 MIN: CPT | Mod: TH

## 2025-08-07 PROCEDURE — 76818 FETAL BIOPHYS PROFILE W/NST: CPT

## 2025-08-07 PROCEDURE — 76820 UMBILICAL ARTERY ECHO: CPT

## 2025-08-07 PROCEDURE — 93325 DOPPLER ECHO COLOR FLOW MAPG: CPT

## 2025-08-14 ENCOUNTER — APPOINTMENT (OUTPATIENT)
Dept: OBGYN | Facility: CLINIC | Age: 30
End: 2025-08-14

## 2025-08-14 ENCOUNTER — APPOINTMENT (OUTPATIENT)
Dept: ANTEPARTUM | Facility: CLINIC | Age: 30
End: 2025-08-14

## 2025-08-14 ENCOUNTER — ASOB RESULT (OUTPATIENT)
Age: 30
End: 2025-08-14

## 2025-08-14 VITALS
BODY MASS INDEX: 26.2 KG/M2 | DIASTOLIC BLOOD PRESSURE: 67 MMHG | SYSTOLIC BLOOD PRESSURE: 108 MMHG | WEIGHT: 163 LBS | HEIGHT: 66 IN

## 2025-08-14 PROCEDURE — 76818 FETAL BIOPHYS PROFILE W/NST: CPT

## 2025-08-14 PROCEDURE — 76821 MIDDLE CEREBRAL ARTERY ECHO: CPT | Mod: 59

## 2025-08-14 PROCEDURE — 76820 UMBILICAL ARTERY ECHO: CPT | Mod: 59

## 2025-08-14 PROCEDURE — 76816 OB US FOLLOW-UP PER FETUS: CPT | Mod: 59

## 2025-08-14 PROCEDURE — 99213 OFFICE O/P EST LOW 20 MIN: CPT | Mod: TH

## 2025-08-18 ENCOUNTER — APPOINTMENT (OUTPATIENT)
Dept: ANTEPARTUM | Facility: CLINIC | Age: 30
End: 2025-08-18
Payer: MEDICAID

## 2025-08-18 ENCOUNTER — APPOINTMENT (OUTPATIENT)
Dept: OBGYN | Facility: CLINIC | Age: 30
End: 2025-08-18
Payer: MEDICAID

## 2025-08-18 VITALS
WEIGHT: 162 LBS | BODY MASS INDEX: 26.03 KG/M2 | SYSTOLIC BLOOD PRESSURE: 116 MMHG | DIASTOLIC BLOOD PRESSURE: 75 MMHG | HEIGHT: 66 IN

## 2025-08-18 PROCEDURE — 76818 FETAL BIOPHYS PROFILE W/NST: CPT

## 2025-08-18 PROCEDURE — 99212 OFFICE O/P EST SF 10 MIN: CPT | Mod: TH

## 2025-08-20 ENCOUNTER — APPOINTMENT (OUTPATIENT)
Dept: ANTEPARTUM | Facility: CLINIC | Age: 30
End: 2025-08-20
Payer: MEDICAID

## 2025-08-20 ENCOUNTER — APPOINTMENT (OUTPATIENT)
Dept: ANTEPARTUM | Facility: CLINIC | Age: 30
End: 2025-08-20

## 2025-08-20 ENCOUNTER — APPOINTMENT (OUTPATIENT)
Dept: OBGYN | Facility: CLINIC | Age: 30
End: 2025-08-20
Payer: MEDICAID

## 2025-08-20 VITALS
HEIGHT: 66 IN | WEIGHT: 161 LBS | SYSTOLIC BLOOD PRESSURE: 111 MMHG | BODY MASS INDEX: 25.88 KG/M2 | DIASTOLIC BLOOD PRESSURE: 69 MMHG

## 2025-08-20 PROCEDURE — 76818 FETAL BIOPHYS PROFILE W/NST: CPT

## 2025-08-20 PROCEDURE — 99213 OFFICE O/P EST LOW 20 MIN: CPT | Mod: TH

## 2025-08-25 ENCOUNTER — OUTPATIENT (OUTPATIENT)
Dept: INPATIENT UNIT | Facility: HOSPITAL | Age: 30
LOS: 1 days | End: 2025-08-25
Payer: MEDICAID

## 2025-08-25 ENCOUNTER — APPOINTMENT (OUTPATIENT)
Dept: ANTEPARTUM | Facility: CLINIC | Age: 30
End: 2025-08-25

## 2025-08-25 ENCOUNTER — ASOB RESULT (OUTPATIENT)
Age: 30
End: 2025-08-25

## 2025-08-25 ENCOUNTER — APPOINTMENT (OUTPATIENT)
Dept: OBGYN | Facility: CLINIC | Age: 30
End: 2025-08-25

## 2025-08-25 VITALS — TEMPERATURE: 98 F

## 2025-08-25 VITALS — DIASTOLIC BLOOD PRESSURE: 77 MMHG | SYSTOLIC BLOOD PRESSURE: 111 MMHG | BODY MASS INDEX: 25.99 KG/M2 | WEIGHT: 161 LBS

## 2025-08-25 VITALS — SYSTOLIC BLOOD PRESSURE: 103 MMHG | HEART RATE: 72 BPM | DIASTOLIC BLOOD PRESSURE: 75 MMHG

## 2025-08-25 DIAGNOSIS — O26.899 OTHER SPECIFIED PREGNANCY RELATED CONDITIONS, UNSPECIFIED TRIMESTER: ICD-10-CM

## 2025-08-25 LAB
APPEARANCE UR: ABNORMAL
BACTERIA # UR AUTO: ABNORMAL /HPF
BILIRUB UR-MCNC: NEGATIVE — SIGNIFICANT CHANGE UP
CAST: 0 /LPF — SIGNIFICANT CHANGE UP (ref 0–4)
COLOR SPEC: YELLOW — SIGNIFICANT CHANGE UP
DIFF PNL FLD: NEGATIVE — SIGNIFICANT CHANGE UP
GLUCOSE UR QL: NEGATIVE MG/DL — SIGNIFICANT CHANGE UP
KETONES UR QL: NEGATIVE MG/DL — SIGNIFICANT CHANGE UP
LEUKOCYTE ESTERASE UR-ACNC: NEGATIVE — SIGNIFICANT CHANGE UP
NITRITE UR-MCNC: NEGATIVE — SIGNIFICANT CHANGE UP
PH UR: 6.5 — SIGNIFICANT CHANGE UP (ref 5–8)
PROT UR-MCNC: NEGATIVE MG/DL — SIGNIFICANT CHANGE UP
RBC CASTS # UR COMP ASSIST: 5 /HPF — HIGH (ref 0–4)
REVIEW: SIGNIFICANT CHANGE UP
SP GR SPEC: 1.02 — SIGNIFICANT CHANGE UP (ref 1–1.03)
SQUAMOUS # UR AUTO: 13 /HPF — HIGH (ref 0–5)
UROBILINOGEN FLD QL: 1 MG/DL — SIGNIFICANT CHANGE UP (ref 0.2–1)
WBC UR QL: 2 /HPF — SIGNIFICANT CHANGE UP (ref 0–5)

## 2025-08-25 PROCEDURE — 76818 FETAL BIOPHYS PROFILE W/NST: CPT

## 2025-08-25 PROCEDURE — 99213 OFFICE O/P EST LOW 20 MIN: CPT | Mod: 25

## 2025-08-25 PROCEDURE — 99213 OFFICE O/P EST LOW 20 MIN: CPT | Mod: TH,25

## 2025-08-25 PROCEDURE — 76815 OB US LIMITED FETUS(S): CPT | Mod: 26

## 2025-08-25 PROCEDURE — 59025 FETAL NON-STRESS TEST: CPT | Mod: 26

## 2025-08-25 PROCEDURE — 76819 FETAL BIOPHYS PROFIL W/O NST: CPT | Mod: 26

## 2025-08-25 RX ORDER — PRENATAL 136/IRON/FOLIC ACID 27 MG-1 MG
1 TABLET ORAL
Refills: 0 | DISCHARGE

## 2025-08-27 ENCOUNTER — APPOINTMENT (OUTPATIENT)
Dept: OBGYN | Facility: CLINIC | Age: 30
End: 2025-08-27
Payer: MEDICAID

## 2025-08-27 ENCOUNTER — APPOINTMENT (OUTPATIENT)
Dept: ANTEPARTUM | Facility: CLINIC | Age: 30
End: 2025-08-27
Payer: MEDICAID

## 2025-08-27 ENCOUNTER — APPOINTMENT (OUTPATIENT)
Dept: ANTEPARTUM | Facility: CLINIC | Age: 30
End: 2025-08-27

## 2025-08-27 ENCOUNTER — INPATIENT (INPATIENT)
Facility: HOSPITAL | Age: 30
LOS: 1 days | Discharge: HOME | End: 2025-08-29
Attending: OBSTETRICS & GYNECOLOGY | Admitting: OBSTETRICS & GYNECOLOGY
Payer: MEDICAID

## 2025-08-27 VITALS
DIASTOLIC BLOOD PRESSURE: 63 MMHG | RESPIRATION RATE: 16 BRPM | TEMPERATURE: 98 F | HEART RATE: 76 BPM | SYSTOLIC BLOOD PRESSURE: 117 MMHG

## 2025-08-27 VITALS
WEIGHT: 162 LBS | DIASTOLIC BLOOD PRESSURE: 69 MMHG | SYSTOLIC BLOOD PRESSURE: 113 MMHG | BODY MASS INDEX: 26.03 KG/M2 | HEIGHT: 66 IN

## 2025-08-27 DIAGNOSIS — Z91.89 OTHER SPECIFIED PERSONAL RISK FACTORS, NOT ELSEWHERE CLASSIFIED: ICD-10-CM

## 2025-08-27 DIAGNOSIS — O26.899 OTHER SPECIFIED PREGNANCY RELATED CONDITIONS, UNSPECIFIED TRIMESTER: ICD-10-CM

## 2025-08-27 LAB
BASOPHILS # BLD AUTO: 0.03 K/UL — SIGNIFICANT CHANGE UP (ref 0–0.2)
BASOPHILS NFR BLD AUTO: 0.3 % — SIGNIFICANT CHANGE UP (ref 0–2)
BLD GP AB SCN SERPL QL: NEGATIVE — SIGNIFICANT CHANGE UP
EOSINOPHIL # BLD AUTO: 0.06 K/UL — SIGNIFICANT CHANGE UP (ref 0–0.5)
EOSINOPHIL NFR BLD AUTO: 0.6 % — SIGNIFICANT CHANGE UP (ref 0–6)
HCT VFR BLD CALC: 34.8 % — SIGNIFICANT CHANGE UP (ref 34.5–45)
HGB BLD-MCNC: 11.4 G/DL — LOW (ref 11.5–15.5)
IMM GRANULOCYTES # BLD AUTO: 0.05 K/UL — SIGNIFICANT CHANGE UP (ref 0–0.07)
IMM GRANULOCYTES NFR BLD AUTO: 0.5 % — SIGNIFICANT CHANGE UP (ref 0–0.9)
LYMPHOCYTES # BLD AUTO: 2.3 K/UL — SIGNIFICANT CHANGE UP (ref 1–3.3)
LYMPHOCYTES NFR BLD AUTO: 23.9 % — SIGNIFICANT CHANGE UP (ref 13–44)
MCHC RBC-ENTMCNC: 26 PG — LOW (ref 27–34)
MCHC RBC-ENTMCNC: 32.8 G/DL — SIGNIFICANT CHANGE UP (ref 32–36)
MCV RBC AUTO: 79.5 FL — LOW (ref 80–100)
MONOCYTES # BLD AUTO: 0.7 K/UL — SIGNIFICANT CHANGE UP (ref 0–0.9)
MONOCYTES NFR BLD AUTO: 7.3 % — SIGNIFICANT CHANGE UP (ref 2–14)
NEUTROPHILS # BLD AUTO: 6.47 K/UL — SIGNIFICANT CHANGE UP (ref 1.8–7.4)
NEUTROPHILS NFR BLD AUTO: 67.4 % — SIGNIFICANT CHANGE UP (ref 43–77)
NRBC # BLD AUTO: 0 K/UL — SIGNIFICANT CHANGE UP (ref 0–0)
NRBC # FLD: 0 K/UL — SIGNIFICANT CHANGE UP (ref 0–0)
NRBC BLD AUTO-RTO: 0 /100 WBCS — SIGNIFICANT CHANGE UP (ref 0–0)
PLATELET # BLD AUTO: 198 K/UL — SIGNIFICANT CHANGE UP (ref 150–400)
PMV BLD: 11.3 FL — SIGNIFICANT CHANGE UP (ref 7–13)
RBC # BLD: 4.38 M/UL — SIGNIFICANT CHANGE UP (ref 3.8–5.2)
RBC # FLD: 13.1 % — SIGNIFICANT CHANGE UP (ref 10.3–14.5)
RH IG SCN BLD-IMP: POSITIVE — SIGNIFICANT CHANGE UP
WBC # BLD: 9.61 K/UL — SIGNIFICANT CHANGE UP (ref 3.8–10.5)
WBC # FLD AUTO: 9.61 K/UL — SIGNIFICANT CHANGE UP (ref 3.8–10.5)

## 2025-08-27 PROCEDURE — 76819 FETAL BIOPHYS PROFIL W/O NST: CPT

## 2025-08-27 PROCEDURE — 99212 OFFICE O/P EST SF 10 MIN: CPT | Mod: TH

## 2025-08-27 RX ORDER — SODIUM CHLORIDE 9 G/1000ML
1000 INJECTION, SOLUTION INTRAVENOUS
Refills: 0 | Status: DISCONTINUED | OUTPATIENT
Start: 2025-08-27 | End: 2025-08-27

## 2025-08-27 RX ORDER — SODIUM CHLORIDE 9 G/1000ML
1000 INJECTION, SOLUTION INTRAVENOUS ONCE
Refills: 0 | Status: ACTIVE | OUTPATIENT
Start: 2025-08-27 | End: 2025-08-27

## 2025-08-28 ENCOUNTER — APPOINTMENT (OUTPATIENT)
Dept: ANTEPARTUM | Facility: CLINIC | Age: 30
End: 2025-08-28
Payer: MEDICAID

## 2025-08-28 ENCOUNTER — ASOB RESULT (OUTPATIENT)
Age: 30
End: 2025-08-28

## 2025-08-28 DIAGNOSIS — Z04.9 ENCOUNTER FOR EXAMINATION AND OBSERVATION FOR UNSPECIFIED REASON: ICD-10-CM

## 2025-08-28 LAB — T PALLIDUM AB TITR SER: NEGATIVE — SIGNIFICANT CHANGE UP

## 2025-08-28 PROCEDURE — 99232 SBSQ HOSP IP/OBS MODERATE 35: CPT

## 2025-08-28 PROCEDURE — 76819 FETAL BIOPHYS PROFIL W/O NST: CPT | Mod: 26

## 2025-08-28 RX ORDER — HEPARIN SODIUM 1000 [USP'U]/ML
5000 INJECTION INTRAVENOUS; SUBCUTANEOUS EVERY 12 HOURS
Refills: 0 | Status: DISCONTINUED | OUTPATIENT
Start: 2025-08-28 | End: 2025-08-29

## 2025-08-28 RX ORDER — CALCIUM CARBONATE 750 MG/1
1 TABLET ORAL DAILY
Refills: 0 | Status: DISCONTINUED | OUTPATIENT
Start: 2025-08-28 | End: 2025-08-29

## 2025-08-28 RX ADMIN — CALCIUM CARBONATE 1 TABLET(S): 750 TABLET ORAL at 22:59

## 2025-08-28 RX ADMIN — HEPARIN SODIUM 5000 UNIT(S): 1000 INJECTION INTRAVENOUS; SUBCUTANEOUS at 22:28

## 2025-08-29 ENCOUNTER — TRANSCRIPTION ENCOUNTER (OUTPATIENT)
Age: 30
End: 2025-08-29

## 2025-08-29 ENCOUNTER — APPOINTMENT (OUTPATIENT)
Dept: OBGYN | Facility: CLINIC | Age: 30
End: 2025-08-29

## 2025-08-29 ENCOUNTER — APPOINTMENT (OUTPATIENT)
Dept: ANTEPARTUM | Facility: CLINIC | Age: 30
End: 2025-08-29

## 2025-08-29 VITALS
RESPIRATION RATE: 18 BRPM | OXYGEN SATURATION: 98 % | TEMPERATURE: 98 F | DIASTOLIC BLOOD PRESSURE: 69 MMHG | HEART RATE: 84 BPM | SYSTOLIC BLOOD PRESSURE: 127 MMHG

## 2025-08-29 DIAGNOSIS — O99.891 OTHER SPECIFIED DISEASES AND CONDITIONS COMPLICATING PREGNANCY: ICD-10-CM

## 2025-08-29 DIAGNOSIS — Z3A.35 35 WEEKS GESTATION OF PREGNANCY: ICD-10-CM

## 2025-08-29 DIAGNOSIS — D21.9 BENIGN NEOPLASM OF CONNECTIVE AND OTHER SOFT TISSUE, UNSPECIFIED: ICD-10-CM

## 2025-08-29 DIAGNOSIS — O36.8330 MATERNAL CARE FOR ABNORMALITIES OF THE FETAL HEART RATE OR RHYTHM, THIRD TRIMESTER, NOT APPLICABLE OR UNSPECIFIED: ICD-10-CM

## 2025-08-29 PROCEDURE — 99231 SBSQ HOSP IP/OBS SF/LOW 25: CPT

## 2025-08-31 LAB
CULTURE RESULTS: SIGNIFICANT CHANGE UP
SPECIMEN SOURCE: SIGNIFICANT CHANGE UP

## 2025-09-02 ENCOUNTER — APPOINTMENT (OUTPATIENT)
Dept: ANTEPARTUM | Facility: CLINIC | Age: 30
End: 2025-09-02
Payer: MEDICAID

## 2025-09-02 ENCOUNTER — APPOINTMENT (OUTPATIENT)
Dept: OBGYN | Facility: CLINIC | Age: 30
End: 2025-09-02

## 2025-09-02 VITALS — SYSTOLIC BLOOD PRESSURE: 106 MMHG | DIASTOLIC BLOOD PRESSURE: 66 MMHG

## 2025-09-02 DIAGNOSIS — Z3A.36 36 WEEKS GESTATION OF PREGNANCY: ICD-10-CM

## 2025-09-02 PROCEDURE — 76818 FETAL BIOPHYS PROFILE W/NST: CPT

## 2025-09-03 LAB — HIV1+2 AB SPEC QL IA.RAPID: NONREACTIVE

## 2025-09-04 ENCOUNTER — APPOINTMENT (OUTPATIENT)
Dept: ANTEPARTUM | Facility: CLINIC | Age: 30
End: 2025-09-04
Payer: MEDICAID

## 2025-09-04 ENCOUNTER — OUTPATIENT (OUTPATIENT)
Dept: OUTPATIENT SERVICES | Facility: HOSPITAL | Age: 30
LOS: 1 days | End: 2025-09-04

## 2025-09-04 ENCOUNTER — APPOINTMENT (OUTPATIENT)
Dept: OBGYN | Facility: CLINIC | Age: 30
End: 2025-09-04
Payer: MEDICAID

## 2025-09-04 ENCOUNTER — ASOB RESULT (OUTPATIENT)
Age: 30
End: 2025-09-04

## 2025-09-04 VITALS
TEMPERATURE: 98 F | DIASTOLIC BLOOD PRESSURE: 66 MMHG | SYSTOLIC BLOOD PRESSURE: 106 MMHG | RESPIRATION RATE: 16 BRPM | HEART RATE: 86 BPM | WEIGHT: 158.95 LBS | OXYGEN SATURATION: 98 % | HEIGHT: 66 IN

## 2025-09-04 VITALS — BODY MASS INDEX: 25.99 KG/M2 | SYSTOLIC BLOOD PRESSURE: 102 MMHG | DIASTOLIC BLOOD PRESSURE: 67 MMHG | WEIGHT: 161 LBS

## 2025-09-04 VITALS — HEIGHT: 66 IN | BODY MASS INDEX: 25.99 KG/M2

## 2025-09-04 DIAGNOSIS — O34.211 MATERNAL CARE FOR LOW TRANSVERSE SCAR FROM PREVIOUS CESAREAN DELIVERY: ICD-10-CM

## 2025-09-04 LAB
APPEARANCE UR: CLEAR — SIGNIFICANT CHANGE UP
BASOPHILS # BLD AUTO: 0.02 K/UL — SIGNIFICANT CHANGE UP (ref 0–0.2)
BASOPHILS NFR BLD AUTO: 0.3 % — SIGNIFICANT CHANGE UP (ref 0–2)
BILIRUB UR-MCNC: NEGATIVE — SIGNIFICANT CHANGE UP
BLD GP AB SCN SERPL QL: NEGATIVE — SIGNIFICANT CHANGE UP
COLOR SPEC: YELLOW — SIGNIFICANT CHANGE UP
DIFF PNL FLD: NEGATIVE — SIGNIFICANT CHANGE UP
EOSINOPHIL # BLD AUTO: 0.04 K/UL — SIGNIFICANT CHANGE UP (ref 0–0.5)
EOSINOPHIL NFR BLD AUTO: 0.5 % — SIGNIFICANT CHANGE UP (ref 0–6)
GLUCOSE UR QL: NEGATIVE MG/DL — SIGNIFICANT CHANGE UP
HCT VFR BLD CALC: 34.8 % — SIGNIFICANT CHANGE UP (ref 34.5–45)
HGB BLD-MCNC: 11.5 G/DL — SIGNIFICANT CHANGE UP (ref 11.5–15.5)
IMM GRANULOCYTES NFR BLD AUTO: 0.4 % — SIGNIFICANT CHANGE UP (ref 0–0.9)
KETONES UR QL: NEGATIVE MG/DL — SIGNIFICANT CHANGE UP
LEUKOCYTE ESTERASE UR-ACNC: NEGATIVE — SIGNIFICANT CHANGE UP
LYMPHOCYTES # BLD AUTO: 1.85 K/UL — SIGNIFICANT CHANGE UP (ref 1–3.3)
LYMPHOCYTES # BLD AUTO: 24.7 % — SIGNIFICANT CHANGE UP (ref 13–44)
MCHC RBC-ENTMCNC: 26.7 PG — LOW (ref 27–34)
MCHC RBC-ENTMCNC: 33 G/DL — SIGNIFICANT CHANGE UP (ref 32–36)
MCV RBC AUTO: 80.7 FL — SIGNIFICANT CHANGE UP (ref 80–100)
MONOCYTES # BLD AUTO: 0.71 K/UL — SIGNIFICANT CHANGE UP (ref 0–0.9)
MONOCYTES NFR BLD AUTO: 9.5 % — SIGNIFICANT CHANGE UP (ref 2–14)
NEUTROPHILS # BLD AUTO: 4.85 K/UL — SIGNIFICANT CHANGE UP (ref 1.8–7.4)
NEUTROPHILS NFR BLD AUTO: 64.6 % — SIGNIFICANT CHANGE UP (ref 43–77)
NITRITE UR-MCNC: NEGATIVE — SIGNIFICANT CHANGE UP
PH UR: 7.5 — SIGNIFICANT CHANGE UP (ref 5–8)
PLATELET # BLD AUTO: 213 K/UL — SIGNIFICANT CHANGE UP (ref 150–400)
PROT UR-MCNC: NEGATIVE MG/DL — SIGNIFICANT CHANGE UP
RBC # BLD: 4.31 M/UL — SIGNIFICANT CHANGE UP (ref 3.8–5.2)
RBC # FLD: 13.5 % — SIGNIFICANT CHANGE UP (ref 10.3–14.5)
RH IG SCN BLD-IMP: POSITIVE — SIGNIFICANT CHANGE UP
SP GR SPEC: 1.02 — SIGNIFICANT CHANGE UP (ref 1–1.03)
UROBILINOGEN FLD QL: 1 MG/DL — SIGNIFICANT CHANGE UP (ref 0.2–1)
WBC # BLD: 7.5 K/UL — SIGNIFICANT CHANGE UP (ref 3.8–10.5)
WBC # FLD AUTO: 7.5 K/UL — SIGNIFICANT CHANGE UP (ref 3.8–10.5)

## 2025-09-04 PROCEDURE — 99212 OFFICE O/P EST SF 10 MIN: CPT | Mod: TH

## 2025-09-04 PROCEDURE — 76821 MIDDLE CEREBRAL ARTERY ECHO: CPT | Mod: 59

## 2025-09-04 PROCEDURE — 76818 FETAL BIOPHYS PROFILE W/NST: CPT

## 2025-09-04 PROCEDURE — 76820 UMBILICAL ARTERY ECHO: CPT | Mod: 59

## 2025-09-04 PROCEDURE — 76816 OB US FOLLOW-UP PER FETUS: CPT | Mod: 59

## 2025-09-09 ENCOUNTER — APPOINTMENT (OUTPATIENT)
Dept: OBGYN | Facility: CLINIC | Age: 30
End: 2025-09-09
Payer: MEDICAID

## 2025-09-09 ENCOUNTER — APPOINTMENT (OUTPATIENT)
Dept: ANTEPARTUM | Facility: CLINIC | Age: 30
End: 2025-09-09
Payer: MEDICAID

## 2025-09-09 ENCOUNTER — ASOB RESULT (OUTPATIENT)
Age: 30
End: 2025-09-09

## 2025-09-09 VITALS
SYSTOLIC BLOOD PRESSURE: 109 MMHG | DIASTOLIC BLOOD PRESSURE: 70 MMHG | WEIGHT: 164 LBS | HEIGHT: 66 IN | BODY MASS INDEX: 26.36 KG/M2

## 2025-09-09 PROCEDURE — 99212 OFFICE O/P EST SF 10 MIN: CPT | Mod: TH,25

## 2025-09-09 PROCEDURE — 76818 FETAL BIOPHYS PROFILE W/NST: CPT

## 2025-09-12 ENCOUNTER — NON-APPOINTMENT (OUTPATIENT)
Age: 30
End: 2025-09-12

## 2025-09-12 ENCOUNTER — TRANSCRIPTION ENCOUNTER (OUTPATIENT)
Age: 30
End: 2025-09-12

## 2025-09-12 ENCOUNTER — RESULT REVIEW (OUTPATIENT)
Age: 30
End: 2025-09-12

## 2025-09-12 PROBLEM — O34.211 MATERNAL CARE FOR LOW TRANSVERSE SCAR FROM PREVIOUS CESAREAN DELIVERY: Chronic | Status: ACTIVE | Noted: 2025-09-04

## (undated) DEVICE — DRSG DERMABOND PRINEO 22CM